# Patient Record
Sex: FEMALE | Race: WHITE | NOT HISPANIC OR LATINO | Employment: UNEMPLOYED | URBAN - METROPOLITAN AREA
[De-identification: names, ages, dates, MRNs, and addresses within clinical notes are randomized per-mention and may not be internally consistent; named-entity substitution may affect disease eponyms.]

---
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
## 2023-01-30 ENCOUNTER — OFFICE VISIT (OUTPATIENT)
Dept: PSYCHIATRY | Facility: CLINIC | Age: 18
End: 2023-01-30

## 2023-01-30 VITALS
BODY MASS INDEX: 18.21 KG/M2 | WEIGHT: 116 LBS | HEIGHT: 67 IN | SYSTOLIC BLOOD PRESSURE: 116 MMHG | HEART RATE: 80 BPM | DIASTOLIC BLOOD PRESSURE: 79 MMHG

## 2023-01-30 DIAGNOSIS — F41.1 GAD (GENERALIZED ANXIETY DISORDER): ICD-10-CM

## 2023-01-30 DIAGNOSIS — F50.89 OTHER DISORDER OF EATING: ICD-10-CM

## 2023-01-30 DIAGNOSIS — F33.9 DEPRESSION, RECURRENT (HCC): Primary | ICD-10-CM

## 2023-01-30 DIAGNOSIS — F34.9 PERSISTENT MOOD (AFFECTIVE) DISORDER, UNSPECIFIED (HCC): ICD-10-CM

## 2023-01-30 DIAGNOSIS — F41.0 PANIC DISORDER: ICD-10-CM

## 2023-01-30 DIAGNOSIS — F43.21 GRIEF: ICD-10-CM

## 2023-01-30 NOTE — BH TREATMENT PLAN
TREATMENT PLAN (Medication Management Only)         [facility]     Name and Date of [address]  2005  Date of Treatment Plan: October 3, 2020, updated June 22, 2021, December 14/2021, June 2, 2022, January 30, 2023  Diagnosis/Diagnoses:    1  Persistent mood (affective) disorder, unspecified ([address] )    2  Panic disorder       Strengths/Personal Resources for Self-Care: supportive family  Area/Areas of need (in own words): anxiety, depression panic  1          Long Term Goal: decrease anxiety, panic attacks and depression, increase weight by 10 lbs  Target Date:6 months - 7/30/2023  Person/Persons responsible for completion of goal: Nadia, provider, family  2          Short Term Objective (s) - How will we reach this goal?:   A  Provider new recommended medication/dosage changes and/or continue medication(s): continue current medications as prescribed (add klonopin, increase buspar to 15 mg BID)  B  coping skills  C  rest and increased nutrition  Target Date:6 months -   7/30/2023  Person/Persons Responsible for Completion of Goal: parisa Zuniga, family  Progress Towards Goals: continuing treatment  Treatment Modality: medication management every 1-3 months  Review due 180 days from date of this plan: 6 months - 7/30/2023     Expected length of service: maintenance  My Physician/PA/NP and I have developed this plan together and I agree to work on the goals and objectives   I understand the treatment goals that were developed for my treatment

## 2023-01-30 NOTE — PSYCH
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  Reports anxiety continues when she goes out, "worried about something bad is going to happen " BF is supportive and she reports the relationship as good  Appetite is improved and she is sleeping  Anxiety keeps her from driving  Takes medication as prescribed  Family are supportive   She is social with friends, feels "a little better going out with them "    HPI ROS Appetite Changes and Sleep: normal appetite and normal energy level    Review Of Systems:     Mood Anxiety and Depression   Behavior Normal    Thought Content Disturbing Thoughts, Feelings   General Emotional Problems   Personality Normal   Other Psych Symptoms Normal   Constitutional As Noted in HPI   ENT As Noted in HPI   Cardiovascular As Noted in HPI   Respiratory As Noted in HPI   Gastrointestinal As Noted in HPI   Genitourinary As Noted in HPI   Musculoskeletal As Noted in HPI   Integumentary As Noted in HPI   Neurological As Noted in HPI   Endocrine Normal    Other Symptoms Normal        Substance Abuse History:  Social History     Substance and Sexual Activity   Drug Use Not Currently       Family Psychiatric History:   Family History   Problem Relation Age of Onset   • Anxiety disorder Mother    • Bipolar disorder Maternal Aunt    • Anxiety disorder Maternal Grandmother    • Depression Maternal Grandmother        The following portions of the patient's history were reviewed and updated as appropriate: allergies, current medications, past family history, past medical history, past social history, past surgical history and problem list     Social History Socioeconomic History   • Marital status: Single     Spouse name: Not on file   • Number of children: Not on file   • Years of education: Not on file   • Highest education level: Not on file   Occupational History   • Not on file   Tobacco Use   • Smoking status: Never   • Smokeless tobacco: Never   Vaping Use   • Vaping Use: Never used   Substance and Sexual Activity   • Alcohol use: Never   • Drug use: Not Currently   • Sexual activity: Yes     Partners: Male   Other Topics Concern   • Not on file   Social History Narrative   • Not on file     Social Determinants of Health     Financial Resource Strain: Not on file   Food Insecurity: Not on file   Transportation Needs: Not on file   Physical Activity: Not on file   Stress: Not on file   Social Connections: Not on file   Intimate Partner Violence: Not on file   Housing Stability: Not on file     Social History     Social History Narrative   • Not on file       Objective:       Mental status:  Appearance calm and cooperative , adequate hygiene and grooming and good eye contact    Mood anxious   Affect affect appropriate    Speech a normal rate   Thought Processes normal thought processes   Hallucinations no hallucinations present    Thought Content no delusions   Abnormal Thoughts no suicidal thoughts  and no homicidal thoughts    Orientation  oriented to person and place and time   Remote Memory short term memory intact and long term memory intact   Attention Span concentration intact   Intellect Appears to be of Average Intelligence   Insight Limited insight   Judgement judgment was limited   Muscle Strength Muscle strength and tone were normal   Language no difficulty naming common objects   Fund of Knowledge displays adequate knowledge of current events   Pain none   Pain Scale 0       Assessment/Plan:       Diagnoses and all orders for this visit:    Depression, recurrent ([facility] [address] )    Other disorder of eating    RAJAN (generalized anxiety disorder)  - cariprazine (VRAYLAR) 3 MG capsule; Take 1 capsule (3 mg total) by mouth daily    Grief death of father December 2021    Panic disorder  -     cariprazine (VRAYLAR) 3 MG capsule; Take 1 capsule (3 mg total) by mouth daily    Persistent mood (affective) disorder, unspecified (HCC)  -     cariprazine (VRAYLAR) 3 MG capsule; Take 1 capsule (3 mg total) by mouth daily            Treatment Recommendations- Risks Benefits      Immediate Medical/Psychiatric/Psychotherapy Treatments and Any Precautions:  reviewed medication continue with treatment plan Vraylar 3 mg daily    Risks, Benefits And Possible Side Effects Of Medications:  {PSYCH RISK, BENEFITS AND POSSIBLE SIDE EFFECTS (Optional):43379    Controlled Medication Discussion: she is aware of safe use and storage of medication     Psychotherapy Provided: 30 min Individual psychotherapy provided  Supportive therapy  Medication evaluation  Mood assessment  Coping skills with friends    Medication education    Goals discussed in session: decrease anxiety      Treatment plan due this session enacted October 3, 2020, updated June 22, 2021, December 14/2021, June 2, 2022, January 30, 2023

## 2023-03-20 DIAGNOSIS — F32.A DEPRESSION, UNSPECIFIED DEPRESSION TYPE: ICD-10-CM

## 2023-03-20 RX ORDER — DESVENLAFAXINE 100 MG/1
TABLET, EXTENDED RELEASE ORAL
Qty: 30 TABLET | Refills: 3 | Status: SHIPPED | OUTPATIENT
Start: 2023-03-20

## 2023-03-20 NOTE — TELEPHONE ENCOUNTER
Medication Refill Request     Name of Medication desvenlafaxine (PRISTIQ) 100 mg 24 hr tablet    Dose/Frequency Take 1 tablet (100 mg total) by mouth every morning  Quantity 30  Verified pharmacy   [x]  Verified ordering Provider   [x]  Does patient have enough for the next 3 days? Yes [x] No []  Does patient have a follow-up appointment scheduled?  Yes [x] No []   If so when is appointment: 04/06/23 @ 10 am

## 2023-03-30 ENCOUNTER — OFFICE VISIT (OUTPATIENT)
Dept: URGENT CARE | Facility: CLINIC | Age: 18
End: 2023-03-30

## 2023-03-30 VITALS
WEIGHT: 120 LBS | TEMPERATURE: 98.7 F | BODY MASS INDEX: 18.79 KG/M2 | HEART RATE: 86 BPM | RESPIRATION RATE: 14 BRPM | OXYGEN SATURATION: 100 %

## 2023-03-30 DIAGNOSIS — R30.0 DYSURIA: Primary | ICD-10-CM

## 2023-03-30 LAB
SL AMB  POCT GLUCOSE, UA: ABNORMAL
SL AMB LEUKOCYTE ESTERASE,UA: ABNORMAL
SL AMB POCT BILIRUBIN,UA: ABNORMAL
SL AMB POCT BLOOD,UA: ABNORMAL
SL AMB POCT CLARITY,UA: CLEAR
SL AMB POCT COLOR,UA: YELLOW
SL AMB POCT KETONES,UA: ABNORMAL
SL AMB POCT NITRITE,UA: ABNORMAL
SL AMB POCT PH,UA: 7
SL AMB POCT SPECIFIC GRAVITY,UA: 1.01
SL AMB POCT URINE PROTEIN: ABNORMAL
SL AMB POCT UROBILINOGEN: 0.2

## 2023-03-30 RX ORDER — PHENAZOPYRIDINE HYDROCHLORIDE 200 MG/1
200 TABLET, FILM COATED ORAL
Qty: 6 TABLET | Refills: 0 | Status: SHIPPED | OUTPATIENT
Start: 2023-03-30 | End: 2023-04-01

## 2023-03-30 NOTE — LETTER
March 30, 2023     Patient: Gita Medrano   YOB: 2005   Date of Visit: 3/30/2023       To Whom it May Concern:    Bianca Arauz was seen in my clinic on 3/30/2023  She may return to school/work on 3/31/2023  If you have any questions or concerns, please don't hesitate to call           Sincerely,          Norma Leyva PA-C        CC: No Recipients

## 2023-03-30 NOTE — PROGRESS NOTES
[facility]        NAME: Rose Paul is a 21 y o  female  : 2005    MRN: [id number]  DATE: 2023  TIME: 1:38 PM    Assessment and Plan   Dysuria [R30 0]  1  Dysuria  POCT urine dip    phenazopyridine (PYRIDIUM) 200 mg tablet    Urine culture        Must stay hydrated  UA normal so will hold off on abx unless culture comes back positive  Discussed strict return to care precautions as well as red flag symptoms which should prompt immediate ED referral  Pt verbalized understanding and is in agreement with plan  Please follow up with your primary care provider within the next week  Please remember that your visit today was with an urgent care provider and should not replace follow up with your primary care provider for chronic medical issues or annual physicals  Patient Instructions       Follow up with PCP in 3-5 days  Proceed to  ER if symptoms worsen  Chief Complaint     Chief Complaint   Patient presents with   • Possible UTI     Pt presents with burning sensation with urination, frequency; started this morning         History of Present Illness       Pt is a(n) 21 y o  female with pmh recurrent UTIs who presents out of concern for a UTI  Dysuria: Yes  Urgency: Yes  Frequency: Yes  Hematuria: No  Cloudy/malodorous urine: No  Vaginal discharge/itching: No  Concerns for STD: No  Possibility of pregnancy: No  LMP: Patient's last menstrual period was 2023 (exact date)  Fever: No  Flank pain: No  Nausea/vomiting: No  Previous UTIs: Yes  Last UTI: 1 mo ago        Review of Systems   Review of Systems   Constitutional: Negative for chills, diaphoresis and fever  Respiratory: Negative for shortness of breath  Cardiovascular: Negative for chest pain  Gastrointestinal: Negative for abdominal pain, nausea and vomiting  Genitourinary: Positive for dysuria, frequency and urgency  Negative for flank pain and hematuria     Musculoskeletal: Negative for back pain and myalgias  Psychiatric/Behavioral: Negative for confusion  Current Medications       Current Outpatient Medications:   •  cariprazine (VRAYLAR) 3 MG capsule, Take 1 capsule (3 mg total) by mouth daily, Disp: 30 capsule, Rfl: 3  •  desvenlafaxine (PRISTIQ) 100 mg 24 hr tablet, TAKE ONE TABLET BY MOUTH EVERY MORNING, Disp: 30 tablet, Rfl: 3  •  hydrOXYzine HCL (ATARAX) 10 mg tablet, Take 1 tablet (10 mg total) by mouth every 6 (six) hours as needed for anxiety, Disp: 30 tablet, Rfl: 0  •  phenazopyridine (PYRIDIUM) 200 mg tablet, Take 1 tablet (200 mg total) by mouth 3 (three) times a day with meals for 2 days, Disp: 6 tablet, Rfl: 0  •  fluvoxaMINE (LUVOX) 25 MG tablet, Take 25 mg by mouth daily at bedtime (Patient not taking: Reported on 8/21/2022), Disp: , Rfl:   •  L-Methylfolate 15 MG TABS, Take 15 mg by mouth in the morning (Patient not taking: Reported on 8/21/2022), Disp: , Rfl:   •  OXcarbazepine (TRILEPTAL) 150 mg tablet, Take 150 mg by mouth daily (Patient not taking: Reported on 8/21/2022), Disp: , Rfl:     Current Allergies     Allergies as of 03/30/2023   • (No Known Allergies)            The following portions of the patient's history were reviewed and updated as appropriate: allergies, current medications, past family history, past medical history, past social history, past surgical history and problem list      Past Medical History:   Diagnosis Date   • Anxiety    • Depression    • Eating disorder    • Panic attack    • Urinary tract infection        Past Surgical History:   Procedure Laterality Date   • NO PAST SURGERIES         Family History   Problem Relation Age of Onset   • Anxiety disorder Mother    • Bipolar disorder Maternal Aunt    • Anxiety disorder Maternal Grandmother    • Depression Maternal Grandmother          Medications have been verified          Objective   Pulse 86   Temp 98 7 °F (37 1 °C)   Resp 14   Wt 54 4 kg (120 lb)   LMP 03/30/2023 (Exact Date)   SpO2 100% BMI 18 79 kg/m²        Physical Exam     Physical Exam  Vitals and nursing note reviewed  Constitutional:       General: She is not in acute distress  Appearance: Normal appearance  She is not ill-appearing  HENT:      Head: Normocephalic and atraumatic  Cardiovascular:      Rate and Rhythm: Normal rate and regular rhythm  Heart sounds: Normal heart sounds  Pulmonary:      Effort: Pulmonary effort is normal  No respiratory distress  Breath sounds: Normal breath sounds  No wheezing, rhonchi or rales  Abdominal:      General: Abdomen is flat  Palpations: Abdomen is soft  Tenderness: There is no abdominal tenderness  There is no right CVA tenderness, left CVA tenderness or guarding  Skin:     General: Skin is warm and dry  Capillary Refill: Capillary refill takes less than 2 seconds  Neurological:      Mental Status: She is alert and oriented to person, place, and time     Psychiatric:         Behavior: Behavior normal

## 2023-04-02 LAB — BACTERIA UR CULT: ABNORMAL

## 2023-04-06 ENCOUNTER — OFFICE VISIT (OUTPATIENT)
Dept: PSYCHIATRY | Facility: CLINIC | Age: 18
End: 2023-04-06

## 2023-04-06 DIAGNOSIS — F41.1 GAD (GENERALIZED ANXIETY DISORDER): ICD-10-CM

## 2023-04-06 DIAGNOSIS — F41.0 PANIC DISORDER: ICD-10-CM

## 2023-04-06 DIAGNOSIS — F50.89 OTHER DISORDER OF EATING: ICD-10-CM

## 2023-04-06 DIAGNOSIS — Z91.199 NO-SHOW FOR APPOINTMENT: ICD-10-CM

## 2023-04-06 DIAGNOSIS — F34.9 PERSISTENT MOOD (AFFECTIVE) DISORDER, UNSPECIFIED (HCC): Primary | ICD-10-CM

## 2023-04-06 DIAGNOSIS — F43.21 GRIEF: ICD-10-CM

## 2023-04-06 NOTE — PSYCH
No Call  No Show   No Charge    Neema Boston no showed 04/06/23 appointment , staff called and left message to reschedule appointment     Treatment Plan not completed within required time limits due to: Neema Boston no show appointment on 04/06/23

## 2023-05-04 ENCOUNTER — OFFICE VISIT (OUTPATIENT)
Dept: PSYCHIATRY | Facility: CLINIC | Age: 18
End: 2023-05-04

## 2023-05-04 VITALS
SYSTOLIC BLOOD PRESSURE: 104 MMHG | HEART RATE: 78 BPM | HEIGHT: 67 IN | BODY MASS INDEX: 18.21 KG/M2 | WEIGHT: 116 LBS | DIASTOLIC BLOOD PRESSURE: 66 MMHG

## 2023-05-04 DIAGNOSIS — F33.9 DEPRESSION, RECURRENT (HCC): ICD-10-CM

## 2023-05-04 DIAGNOSIS — F41.0 PANIC DISORDER: ICD-10-CM

## 2023-05-04 DIAGNOSIS — F43.21 GRIEF: Primary | ICD-10-CM

## 2023-05-04 DIAGNOSIS — F34.9 PERSISTENT MOOD (AFFECTIVE) DISORDER, UNSPECIFIED (HCC): ICD-10-CM

## 2023-05-04 DIAGNOSIS — F50.89 OTHER DISORDER OF EATING: ICD-10-CM

## 2023-05-04 DIAGNOSIS — F41.1 GAD (GENERALIZED ANXIETY DISORDER): ICD-10-CM

## 2023-05-09 NOTE — PSYCH
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 and is taking online courses  She went to a trip to [facility] and was very nervous on the flight and became homesick; however, she did not have a panic attack  She denied having intrusive thoughts and cannot remember when she had a panic attack  Her boyfriend is supportive  She reports being social and \"hangs out\" with her friends  Takes medication as prescribed  Family, friends and boyfriend are supportive      HPI ROS Appetite Changes and Sleep: normal appetite and normal energy level    Review Of Systems:     Mood Anxiety   Behavior Normal    Thought Content Normal   General Emotional Problems   Personality Normal   Other Psych Symptoms Normal   Constitutional As Noted in HPI   ENT As Noted in HPI   Cardiovascular    Respiratory As Noted in HPI   Gastrointestinal As Noted in HPI   Genitourinary As Noted in HPI   Musculoskeletal As Noted in HPI   Integumentary As Noted in HPI   Neurological As Noted in HPI   Endocrine Normal    Other Symptoms Normal        Substance Abuse History:  Social History     Substance and Sexual Activity   Drug Use Not Currently       Family Psychiatric History:   Family History   Problem Relation Age of Onset   • Anxiety disorder Mother    • Bipolar disorder Maternal Aunt    • Anxiety disorder Maternal Grandmother    • Depression Maternal Grandmother        The following portions of the patient's history were reviewed and updated as appropriate: allergies, current medications, past family history, past " medical history, past social history, past surgical history and problem list     Social History     Socioeconomic History   • Marital status: Single     Spouse name: Not on file   • Number of children: Not on file   • Years of education: Not on file   • Highest education level: Not on file   Occupational History   • Not on file   Tobacco Use   • Smoking status: Never     Passive exposure: Never   • Smokeless tobacco: Never   Vaping Use   • Vaping Use: Every day   • Substances: Nicotine   Substance and Sexual Activity   • Alcohol use: Never   • Drug use: Not Currently   • Sexual activity: Yes     Partners: Male   Other Topics Concern   • Not on file   Social History Narrative   • Not on file     Social Determinants of Health     Financial Resource Strain: Not on file   Food Insecurity: Not on file   Transportation Needs: Not on file   Physical Activity: Not on file   Stress: Not on file   Social Connections: Not on file   Intimate Partner Violence: Not on file   Housing Stability: Not on file     Social History     Social History Narrative   • Not on file       Objective:       Mental status:  Appearance calm and cooperative , adequate hygiene and grooming and good eye contact    Mood anxious   Affect affect appropriate    Speech a normal rate   Thought Processes normal thought processes   Hallucinations no hallucinations present    Thought Content no delusions   Abnormal Thoughts no suicidal thoughts  and no homicidal thoughts    Orientation  oriented to person and place and time   Remote Memory short term memory intact and long term memory intact   Attention Span concentration intact   Intellect Appears to be of Average Intelligence   Insight Insight intact   Judgement judgment was intact   Muscle Strength Muscle strength and tone were normal   Language no difficulty naming common objects   Fund of Knowledge displays adequate knowledge of current events   Pain none   Pain Scale 0       Assessment/Plan: Diagnoses and all orders for this visit:    Grief death of father December 2021    RAJAN (generalized anxiety disorder)  -     cariprazine (VRAYLAR) 3 MG capsule; Take 1 capsule (3 mg total) by mouth daily    Panic disorder  -     cariprazine (VRAYLAR) 3 MG capsule; Take 1 capsule (3 mg total) by mouth daily    Persistent mood (affective) disorder, unspecified (HCC)  -     cariprazine (VRAYLAR) 3 MG capsule; Take 1 capsule (3 mg total) by mouth daily    Other disorder of eating    [facility] )            Treatment Recommendations- Risks Benefits      Immediate Medical/Psychiatric/Psychotherapy Treatments and Any Precautions:  reviewed medication continue with treatment plan    Risks, Benefits And Possible Side Effects Of Medications:  {PSYCH RISK, BENEFITS AND POSSIBLE SIDE EFFECTS (Optional):32190       Psychotherapy Provided: 30 min Individual psychotherapy provided  Supportive therapy  Medication evaluation  Mood assessment  Positive reinforcement provided to [facility] for her improvement      Goals discussed in session: Maintain stable mood    Treatment plan not due

## 2023-05-19 ENCOUNTER — TELEPHONE (OUTPATIENT)
Dept: PSYCHIATRY | Facility: CLINIC | Age: 18
End: 2023-05-19

## 2023-05-19 NOTE — TELEPHONE ENCOUNTER
I was unable to leave a  for pt to return call to intake in regards to talk therapy services because  is full

## 2023-05-31 DIAGNOSIS — F34.9 PERSISTENT MOOD (AFFECTIVE) DISORDER, UNSPECIFIED (HCC): ICD-10-CM

## 2023-05-31 DIAGNOSIS — F41.0 PANIC DISORDER: ICD-10-CM

## 2023-05-31 DIAGNOSIS — F41.1 GAD (GENERALIZED ANXIETY DISORDER): ICD-10-CM

## 2023-05-31 RX ORDER — CARIPRAZINE 3 MG/1
CAPSULE, GELATIN COATED ORAL
Qty: 30 CAPSULE | Refills: 3 | Status: SHIPPED | OUTPATIENT
Start: 2023-05-31

## 2023-07-15 DIAGNOSIS — F32.A DEPRESSION, UNSPECIFIED DEPRESSION TYPE: ICD-10-CM

## 2023-07-15 RX ORDER — DESVENLAFAXINE 100 MG/1
TABLET, EXTENDED RELEASE ORAL
Qty: 30 TABLET | Refills: 3 | Status: SHIPPED | OUTPATIENT
Start: 2023-07-15

## 2023-09-22 ENCOUNTER — DOCUMENTATION (OUTPATIENT)
Dept: BEHAVIORAL/MENTAL HEALTH CLINIC | Facility: CLINIC | Age: 18
End: 2023-09-22

## 2023-09-22 NOTE — PROGRESS NOTES
Psychotherapy Discharge Summary    Preferred Name: Wicho Wooten  YOB: 2005    Admission date to psychotherapy: 1/17/22    Referred by: Dr. Micky Fitzpatrick    Presenting Problem: Grief, Anxiety    Course of treatment included : family counseling, individual therapy  and Referral to [facility]    Progress/Outcome of Treatment Goals (brief summary of course of treatment) Nadia attended sessions along with medication management, CBT was primarily used. Kike Nurse was not making gains and it was agreed that she would be referred to [address] [place] [address]. Treatment Complications (if any): none    Treatment Progress: fair    Current [facility] Psychiatric Provider: Dr. Micky Fitzpatrick    Discharge Medications include: This provider does not prescribe or manage medications. Discharge Date: 9/22/23    Discharge Diagnosis: Agoraphobia with paic attacks;  Persistent Mood DO, unspecified    Criteria for Discharge: need to be transferred to another service/level of care within the system    Aftercare recommendations include (include specific referral names and phone numbers, if appropriate):   -Call 911 if psychiatric emergency/suicidal thoughts  -Utilize 988 or the Talk to Text line for support by texting "Hello" to [phone]  -Follow up with referral for [facility]    Prognosis: fair

## 2023-10-18 DIAGNOSIS — F32.A DEPRESSION, UNSPECIFIED DEPRESSION TYPE: ICD-10-CM

## 2023-10-18 RX ORDER — DESVENLAFAXINE 100 MG/1
100 TABLET, EXTENDED RELEASE ORAL EVERY MORNING
Qty: 30 TABLET | Refills: 3 | Status: SHIPPED | OUTPATIENT
Start: 2023-10-18

## 2023-10-18 NOTE — TELEPHONE ENCOUNTER
Called patient to schedule appointment with Dr. Tarun Estrella but her VM was full, could not leave message.

## 2023-10-30 ENCOUNTER — TELEPHONE (OUTPATIENT)
Dept: PSYCHIATRY | Facility: CLINIC | Age: 18
End: 2023-10-30

## 2023-10-30 NOTE — TELEPHONE ENCOUNTER
Patient called regarding services. Placed on Epic wait list with preferences below.      Medication Management and Talk Therapy    Zoie Sotelo, Female provider, NO ROF, and prefers in person    Insurance: [employer]  Type: [address]: Shanique Caldwell

## 2023-10-31 ENCOUNTER — OFFICE VISIT (OUTPATIENT)
Dept: FAMILY MEDICINE CLINIC | Facility: CLINIC | Age: 18
End: 2023-10-31
Payer: COMMERCIAL

## 2023-10-31 VITALS
SYSTOLIC BLOOD PRESSURE: 118 MMHG | TEMPERATURE: 97.8 F | RESPIRATION RATE: 18 BRPM | BODY MASS INDEX: 19.71 KG/M2 | WEIGHT: 125.6 LBS | HEART RATE: 90 BPM | HEIGHT: 67 IN | DIASTOLIC BLOOD PRESSURE: 72 MMHG

## 2023-10-31 DIAGNOSIS — Z00.00 ROUTINE ADULT HEALTH MAINTENANCE: Primary | ICD-10-CM

## 2023-10-31 DIAGNOSIS — F33.9 DEPRESSION, RECURRENT (HCC): ICD-10-CM

## 2023-10-31 PROBLEM — F43.21 GRIEF: Status: RESOLVED | Noted: 2022-03-03 | Resolved: 2023-10-31

## 2023-10-31 PROBLEM — F34.9 PERSISTENT MOOD (AFFECTIVE) DISORDER, UNSPECIFIED (HCC): Status: RESOLVED | Noted: 2020-10-03 | Resolved: 2023-10-31

## 2023-10-31 PROCEDURE — 99395 PREV VISIT EST AGE 18-39: CPT | Performed by: NURSE PRACTITIONER

## 2023-10-31 NOTE — PROGRESS NOTES
Assessment/Plan:    {There are no diagnoses linked to this encounter. (Refresh or delete this SmartLink)}        There are no Patient Instructions on file for this visit. No follow-ups on file. Subjective:      Patient ID: Ro Shaw is a 19 y.o. female. Chief Complaint   Patient presents with   • YURI Simon    The following portions of the patient's history were reviewed and updated as appropriate: allergies, current medications, past family history, past medical history, past social history, past surgical history and problem list.    Review of Systems      Current Outpatient Medications   Medication Sig Dispense Refill   • desvenlafaxine (PRISTIQ) 100 mg 24 hr tablet Take 1 tablet (100 mg total) by mouth every morning 30 tablet 3   • hydrOXYzine HCL (ATARAX) 10 mg tablet Take 1 tablet (10 mg total) by mouth every 6 (six) hours as needed for anxiety 30 tablet 0   • Vraylar 3 MG capsule TAKE ONE CAPSULE BY MOUTH EVERY DAY 30 capsule 3     No current facility-administered medications for this visit.        Objective:    /72   Pulse 90   Temp 97.8 °F (36.6 °C)   Resp 18   Ht 5' 7" (1.702 m)   Wt 57 kg (125 lb 9.6 oz)   LMP 10/16/2023 (Exact Date)   BMI 19.67 kg/m²        Physical Exam           FAMILIA Norton

## 2023-10-31 NOTE — PROGRESS NOTES
[address] [place]    NAME: Michelle Grigsby  AGE: 19 y.o. SEX: female  : 2005     DATE: 10/31/2023     Assessment and Plan:     Problem List Items Addressed This Visit        Other    Depression, recurrent ([address])   Other Visit Diagnoses     Routine adult health maintenance    -  Primary          Immunizations and preventive care screenings were discussed with patient today. Appropriate education was printed on patient's after visit summary. Counseling:  Sexual health: discussed sexually transmitted diseases, partner selection, use of condoms, avoidance of unintended pregnancy, and contraceptive alternatives. Exercise: the importance of regular exercise/physical activity was discussed. Recommend exercise 3-5 times per week for at least 30 minutes. Return for Annual physical.     Chief Complaint:     Chief Complaint   Patient presents with   Carrie Anderson LPN        History of Present Illness:     Adult Annual Physical   Patient here for a comprehensive physical exam. The patient reports no problems. Diet and Physical Activity  Diet/Nutrition: well balanced diet. Exercise: no formal exercise. Depression Screening  PHQ-2/9 Depression Screening    Little interest or pleasure in doing things: 1 - several days  Feeling down, depressed, or hopeless: 0 - not at all  Trouble falling or staying asleep, or sleeping too much: 3 - nearly every day  Feeling tired or having little energy: 1 - several days  Poor appetite or overeatin - not at all  Feeling bad about yourself - or that you are a failure or have let yourself or your family down: 0 - not at all  Trouble concentrating on things, such as reading the newspaper or watching television: 0 - not at all  Moving or speaking so slowly that other people could have noticed.  Or the opposite - being so fidgety or restless that you have been moving around a lot more than usual: 0 - not at all  Thoughts that you would be better off dead, or of hurting yourself in some way: 0 - not at all  PHQ-9 Score: 5   PHQ-9 Interpretation: Mild depression        General Health  Sleep: sleeps well. Hearing: normal - bilateral.  Vision: no vision problems. Dental: regular dental visits. /GYN Health  Cycles regular  Has been on OCPs in the past briefly, worried about how this may affect her mental health     Review of Systems:     Review of Systems   Constitutional: Negative. Respiratory: Negative. Cardiovascular: Negative. Gastrointestinal: Negative. Neurological: Negative.        Past Medical History:     Past Medical History:   Diagnosis Date   • Anxiety    • Depression    • Eating disorder    • Panic attack    • Urinary tract infection       Past Surgical History:     Past Surgical History:   Procedure Laterality Date   • NO PAST SURGERIES        Social History:     Social History     Socioeconomic History   • Marital status: Single     Spouse name: None   • Number of children: None   • Years of education: None   • Highest education level: None   Occupational History   • None   Tobacco Use   • Smoking status: Never     Passive exposure: Never   • Smokeless tobacco: Never   Vaping Use   • Vaping Use: Every day   • Substances: Nicotine   Substance and Sexual Activity   • Alcohol use: Never   • Drug use: Not Currently   • Sexual activity: Yes     Partners: Male   Other Topics Concern   • None   Social History Narrative   • None     Social Determinants of Health     Financial Resource Strain: Not on file   Food Insecurity: Not on file   Transportation Needs: Not on file   Physical Activity: Not on file   Stress: Not on file   Social Connections: Not on file   Intimate Partner Violence: Not on file   Housing Stability: Not on file      Family History:     Family History   Problem Relation Age of Onset   • Anxiety disorder Mother    • Leukemia Father    • Diverticulosis Father    • Anxiety disorder Maternal Grandmother    • Depression Maternal Grandmother    • Bipolar disorder Maternal Aunt       Current Medications:     Current Outpatient Medications   Medication Sig Dispense Refill   • desvenlafaxine (PRISTIQ) 100 mg 24 hr tablet Take 1 tablet (100 mg total) by mouth every morning 30 tablet 3   • hydrOXYzine HCL (ATARAX) 10 mg tablet Take 1 tablet (10 mg total) by mouth every 6 (six) hours as needed for anxiety 30 tablet 0   • Vraylar 3 MG capsule TAKE ONE CAPSULE BY MOUTH EVERY DAY 30 capsule 3     No current facility-administered medications for this visit. Allergies:     No Known Allergies   Physical Exam:     /72   Pulse 90   Temp 97.8 °F (36.6 °C)   Resp 18   Ht 5' 7" (1.702 m)   Wt 57 kg (125 lb 9.6 oz)   LMP 10/16/2023 (Exact Date)   BMI 19.67 kg/m²     Physical Exam  Vitals and nursing note reviewed. Constitutional:       General: She is not in acute distress. Appearance: Normal appearance. She is well-developed. HENT:      Head: Normocephalic and atraumatic. Right Ear: Tympanic membrane and external ear normal.      Left Ear: Tympanic membrane and external ear normal.      Nose: Nose normal.      Mouth/Throat:      Pharynx: Oropharynx is clear. Eyes:      Extraocular Movements: Extraocular movements intact. Conjunctiva/sclera: Conjunctivae normal.      Pupils: Pupils are equal, round, and reactive to light. Neck:      Thyroid: No thyromegaly. Vascular: No carotid bruit. Cardiovascular:      Rate and Rhythm: Normal rate and regular rhythm. Pulses: Normal pulses. Heart sounds: Normal heart sounds. No murmur heard. Pulmonary:      Effort: Pulmonary effort is normal. No respiratory distress. Breath sounds: Normal breath sounds. Abdominal:      General: Bowel sounds are normal. There is no distension. Palpations: Abdomen is soft. Tenderness: There is no abdominal tenderness. Musculoskeletal:         General: No swelling or deformity. Normal range of motion. Cervical back: Normal range of motion and neck supple. Lymphadenopathy:      Cervical: No cervical adenopathy. Skin:     General: Skin is warm and dry. Capillary Refill: Capillary refill takes less than 2 seconds. Neurological:      Mental Status: She is alert. Sensory: No sensory deficit. Motor: No abnormal muscle tone.       Coordination: Coordination normal.      Deep Tendon Reflexes: Reflexes normal.   Psychiatric:         Mood and Affect: Mood normal.         Behavior: Behavior normal.          Joness Primrose, [address] [facility]

## 2023-11-26 DIAGNOSIS — F32.A DEPRESSION, UNSPECIFIED DEPRESSION TYPE: ICD-10-CM

## 2023-11-27 RX ORDER — DESVENLAFAXINE 100 MG/1
100 TABLET, EXTENDED RELEASE ORAL EVERY MORNING
Qty: 30 TABLET | Refills: 11 | Status: SHIPPED | OUTPATIENT
Start: 2023-11-27

## 2023-11-29 ENCOUNTER — TELEPHONE (OUTPATIENT)
Dept: BEHAVIORAL/MENTAL HEALTH CLINIC | Facility: CLINIC | Age: 18
End: 2023-11-29

## 2023-11-29 NOTE — TELEPHONE ENCOUNTER
Tried to call pt to schedule an appt with Dr. Aviva Gaffney. Could not leave message due to voicemail full.

## 2024-01-18 ENCOUNTER — TELEPHONE (OUTPATIENT)
Dept: PSYCHIATRY | Facility: CLINIC | Age: 19
End: 2024-01-18

## 2024-01-18 DIAGNOSIS — F41.1 GAD (GENERALIZED ANXIETY DISORDER): ICD-10-CM

## 2024-01-18 DIAGNOSIS — F41.0 PANIC DISORDER: ICD-10-CM

## 2024-01-18 DIAGNOSIS — F34.9 PERSISTENT MOOD (AFFECTIVE) DISORDER, UNSPECIFIED (HCC): ICD-10-CM

## 2024-01-18 RX ORDER — CARIPRAZINE 3 MG/1
CAPSULE, GELATIN COATED ORAL
Qty: 30 CAPSULE | Refills: 0 | Status: SHIPPED | OUTPATIENT
Start: 2024-01-18

## 2024-02-02 ENCOUNTER — TELEPHONE (OUTPATIENT)
Dept: FAMILY MEDICINE CLINIC | Facility: CLINIC | Age: 19
End: 2024-02-02

## 2024-02-02 NOTE — TELEPHONE ENCOUNTER
Pt dropped off working paper physical form for Shruti, in nurse pending one. Please call when completed.

## 2024-02-05 NOTE — TELEPHONE ENCOUNTER
Form completed, requires TB screening, and I do not see a PPD  in her record.  Form is nurse pending. FAMILIA Rice

## 2024-02-05 NOTE — TELEPHONE ENCOUNTER
Mom advised, she's going to call her previous doctor to find out. She'll call back to set up the PPD appt if needed.

## 2024-02-05 NOTE — TELEPHONE ENCOUNTER
Left message for patient to call back.  Find out if she had ppd done anywhere else- if not schedule nurse visit    Shruti Majano CMA

## 2024-02-13 ENCOUNTER — TELEPHONE (OUTPATIENT)
Dept: PSYCHIATRY | Facility: CLINIC | Age: 19
End: 2024-02-13

## 2024-02-13 ENCOUNTER — OFFICE VISIT (OUTPATIENT)
Dept: URGENT CARE | Facility: CLINIC | Age: 19
End: 2024-02-13
Payer: COMMERCIAL

## 2024-02-13 VITALS
BODY MASS INDEX: 22.6 KG/M2 | RESPIRATION RATE: 18 BRPM | HEIGHT: 67 IN | TEMPERATURE: 98 F | HEART RATE: 110 BPM | OXYGEN SATURATION: 100 % | WEIGHT: 144 LBS

## 2024-02-13 DIAGNOSIS — J02.9 SORE THROAT: Primary | ICD-10-CM

## 2024-02-13 LAB — S PYO AG THROAT QL: NEGATIVE

## 2024-02-13 PROCEDURE — 87880 STREP A ASSAY W/OPTIC: CPT | Performed by: PHYSICIAN ASSISTANT

## 2024-02-13 PROCEDURE — 87636 SARSCOV2 & INF A&B AMP PRB: CPT | Performed by: PHYSICIAN ASSISTANT

## 2024-02-13 PROCEDURE — 87070 CULTURE OTHR SPECIMN AEROBIC: CPT | Performed by: PHYSICIAN ASSISTANT

## 2024-02-13 PROCEDURE — 99213 OFFICE O/P EST LOW 20 MIN: CPT | Performed by: PHYSICIAN ASSISTANT

## 2024-02-13 RX ORDER — BENZONATATE 100 MG/1
100 CAPSULE ORAL 3 TIMES DAILY PRN
Qty: 20 CAPSULE | Refills: 0 | Status: SHIPPED | OUTPATIENT
Start: 2024-02-13 | End: 2024-02-22

## 2024-02-13 NOTE — PROGRESS NOTES
[facility]        NAME: Nadia Galeano is a 18 y.o. female  : 2005    MRN: [id number]  DATE: 2024  TIME: 2:50 PM    Assessment and Plan   Sore throat [J02.9]  1. Sore throat  Covid19 and INFLUENZA A/B PCR    POCT rapid ANTIGEN strepA    Throat culture    benzonatate (TESSALON PERLES) 100 mg capsule            Patient Instructions     Patient Instructions   - rapid strep test. I will send the culture out to the lab as well as a COVID/FLU test. Results should be available within 24-48 hours.   Viral Syndrome   WHAT YOU NEED TO KNOW:   Viral syndrome is a term used for symptoms of an infection caused by a virus. Viruses are spread easily from person to person on shared items.  DISCHARGE INSTRUCTIONS:   Call your local emergency number (911 in the [place]), or have someone call if:   You have a seizure.    You cannot be woken.    You have chest pain or trouble breathing.    Return to the emergency department if:   You have a stiff neck, a bad headache, and sensitivity to light.    You feel weak, dizzy, or confused.    You stop urinating or urinate a lot less than usual.    You cough up blood or thick yellow or green mucus.    You have severe abdominal pain or your abdomen is larger than usual.    Call your doctor if:   Your symptoms do not get better with treatment or get worse after 3 days.    You have a rash or ear pain.    You have burning when you urinate.    You have questions or concerns about your condition or care.    Medicines:  You may  need any of the following:  Acetaminophen  decreases pain and fever. It is available without a doctor's order. Ask how much to take and how often to take it. Follow directions. Read the labels of all other medicines you are using to see if they also contain acetaminophen, or ask your doctor or pharmacist. Acetaminophen can cause liver damage if not taken correctly.    NSAIDs , such as ibuprofen, help decrease swelling, pain, and fever. NSAIDs can  cause stomach bleeding or kidney problems in certain people. If you take blood thinner medicine, always ask your healthcare provider if NSAIDs are safe for you. Always read the medicine label and follow directions.    Cold medicine  helps decrease swelling, control a cough, and relieve chest or nasal congestion.     Saline nasal spray  helps decrease nasal congestion.     Take your medicine as directed.  Contact your healthcare provider if you think your medicine is not helping or if you have side effects. Tell your provider if you are allergic to any medicine. Keep a list of the medicines, vitamins, and herbs you take. Include the amounts, and when and why you take them. Bring the list or the pill bottles to follow-up visits. Carry your medicine list with you in case of an emergency.    Manage your symptoms:   Drink liquids as directed to prevent dehydration.  Ask how much liquid to drink each day and which liquids are best for you. Do not drink liquids with caffeine. Caffeine can make dehydration worse.     Get plenty of rest to help your body heal.  Take naps throughout the day. Ask your healthcare provider when you can return to work and your normal activities.    Use a cool mist humidifier  to increase air moisture in your home. This may make it easier for you to breathe and help decrease your cough.     Drink tea with honey or use cough drops for a sore throat.  Cough drops are available without a doctor's order. Follow directions for taking cough drops.    Do not smoke or be close to anyone who is smoking.  Nicotine and other chemicals in cigarettes and cigars can cause lung damage. Smoking can also delay healing. Ask your healthcare provider for information if you currently smoke and need help to quit. E-cigarettes or smokeless tobacco still contain nicotine. Talk to your healthcare provider before you use these products.    Prevent the spread of germs:   Wash your hands often throughout the day.  Use soap  and water. Rub your soapy hands together, lacing your fingers, for at least 20 seconds. Rinse with warm, running water. Dry your hands with a clean towel or paper towel. Use hand  that contains alcohol if soap and water are not available. Teach children how to wash their hands and use hand .         Cover sneezes and coughs.  Turn your face away and cover your mouth and nose with a tissue. Throw the tissue away. Use the bend of your arm if a tissue is not available. Then wash your hands well with soap and water or use hand . Teach children how to cover a cough or sneeze.    Stay home while you are sick.  Avoid crowds as much as possible.    Get the influenza (flu) vaccine as soon as recommended each year.  The flu vaccine is available starting in September or October. Ask your healthcare provider about the pneumonia vaccine. This vaccine is usually recommended every 5 years in older adults.       Follow up with your doctor as directed:  Write down your questions so you remember to ask them during your visits.  © Copyright [employer] 2023 Information is for End User's use only and may not be sold, redistributed or otherwise used for commercial purposes.  The above information is an  only. It is not intended as medical advice for individual conditions or treatments. Talk to your doctor, nurse or pharmacist before following any medical regimen to see if it is safe and effective for you.        Follow up with PCP in 3-5 days.  Proceed to  ER if symptoms worsen.    Chief Complaint     Chief Complaint   Patient presents with    Sore Throat     Sore throat  nasal congestion         History of Present Illness       The patient is a 18-year-old female presenting today for a sore throat, cough, congestion and rhinorrhea.  Symptoms began yesterday.  She does report working and a .  No known sick exposures.        Review of Systems   Review of Systems   Constitutional:  Negative  for activity change, appetite change, chills, fatigue and fever.   HENT:  Positive for congestion, rhinorrhea and sore throat. Negative for ear pain, sinus pressure and sinus pain.    Eyes:  Negative for pain and visual disturbance.   Respiratory:  Negative for cough, chest tightness and shortness of breath.    Cardiovascular:  Negative for chest pain and palpitations.   Gastrointestinal:  Negative for abdominal pain, diarrhea, nausea and vomiting.   Genitourinary:  Negative for dysuria and hematuria.   Musculoskeletal:  Negative for arthralgias, back pain and myalgias.   Skin:  Negative for color change, pallor and rash.   Neurological:  Negative for seizures, syncope and headaches.   All other systems reviewed and are negative.        Current Medications       Current Outpatient Medications:     benzonatate (TESSALON PERLES) 100 mg capsule, Take 1 capsule (100 mg total) by mouth 3 (three) times a day as needed for cough, Disp: 20 capsule, Rfl: 0    cariprazine (Vraylar) 3 MG capsule, TAKE ONE CAPSULE BY MOUTH EVERY DAY, Disp: 30 capsule, Rfl: 0    desvenlafaxine (PRISTIQ) 100 mg 24 hr tablet, TAKE 1 TABLET BY MOUTH IN THE  MORNING, Disp: 30 tablet, Rfl: 11    hydrOXYzine HCL (ATARAX) 10 mg tablet, Take 1 tablet (10 mg total) by mouth every 6 (six) hours as needed for anxiety (Patient not taking: Reported on 2/13/2024), Disp: 30 tablet, Rfl: 0    Current Allergies     Allergies as of 02/13/2024    (No Known Allergies)            The following portions of the patient's history were reviewed and updated as appropriate: allergies, current medications, past family history, past medical history, past social history, past surgical history and problem list.     Past Medical History:   Diagnosis Date    Anxiety     Depression     Eating disorder     Panic attack     Urinary tract infection        Past Surgical History:   Procedure Laterality Date    NO PAST SURGERIES         Family History   Problem Relation Age of Onset  "   Anxiety disorder Mother     Leukemia Father     Diverticulosis Father     Anxiety disorder Maternal Grandmother     Depression Maternal Grandmother     Bipolar disorder Maternal Aunt          Medications have been verified.        Objective   Pulse (!) 110   Temp 98 °F (36.7 °C)   Resp 18   Ht 5' 7\" (1.702 m)   Wt 65.3 kg (144 lb)   LMP 01/16/2024   SpO2 100%   BMI 22.55 kg/m²        Physical Exam     Physical Exam  Vitals and nursing note reviewed.   Constitutional:       General: She is not in acute distress.     Appearance: Normal appearance. She is well-developed and normal weight. She is not ill-appearing, toxic-appearing or diaphoretic.   HENT:      Head: Normocephalic and atraumatic.      Right Ear: Tympanic membrane, ear canal and external ear normal. No drainage, swelling or tenderness. No middle ear effusion. There is no impacted cerumen. Tympanic membrane is not erythematous.      Left Ear: Tympanic membrane, ear canal and external ear normal. No drainage, swelling or tenderness.  No middle ear effusion. There is no impacted cerumen. Tympanic membrane is not erythematous.      Nose: Congestion and rhinorrhea present.      Mouth/Throat:      Mouth: Mucous membranes are moist. No oral lesions.      Pharynx: Oropharynx is clear. Uvula midline. Posterior oropharyngeal erythema present. No pharyngeal swelling, oropharyngeal exudate or uvula swelling.      Tonsils: No tonsillar exudate or tonsillar abscesses. 0 on the right. 0 on the left.   Eyes:      Extraocular Movements: Extraocular movements intact.      Right eye: Normal extraocular motion.      Left eye: Normal extraocular motion.      Conjunctiva/sclera: Conjunctivae normal.      Pupils: Pupils are equal, round, and reactive to light.   Cardiovascular:      Rate and Rhythm: Normal rate and regular rhythm.      Heart sounds: Normal heart sounds. No murmur heard.     No friction rub. No gallop.   Pulmonary:      Effort: Pulmonary effort is " normal. No respiratory distress.      Breath sounds: Normal breath sounds. No stridor. No wheezing, rhonchi or rales.   Chest:      Chest wall: No tenderness.   Abdominal:      General: Abdomen is flat. Bowel sounds are normal.      Palpations: Abdomen is soft.   Musculoskeletal:         General: Normal range of motion.   Lymphadenopathy:      Cervical: Cervical adenopathy present.   Skin:     General: Skin is warm and dry.      Capillary Refill: Capillary refill takes less than 2 seconds.   Neurological:      Mental Status: She is alert.

## 2024-02-13 NOTE — LETTER
February 13, 2024     Patient: Nadia Galeano  YOB: 2005  Date of Visit: 2/13/2024      To Whom it May Concern:    Nadia Galeano is under my professional care. Nadia was seen in my office on 2/13/2024. Nadia may return to work on 12/16 or 2/19 pending negative test results and symptom resolution .    If you have any questions or concerns, please don't hesitate to call.         Sincerely,          Nayeli Garcia PA-C        CC: No Recipients

## 2024-02-13 NOTE — TELEPHONE ENCOUNTER
Received call from patient requesting a call back asking if it is ok to take a Sudafed  with her antidepressants?  Are there any interactions?  Patient awaiting a call back.  Next visit with Dr Aracelis Stoll 2/22/2024.  Patient would like a call back.

## 2024-02-13 NOTE — PATIENT INSTRUCTIONS
- rapid strep test. I will send the culture out to the lab as well as a COVID/FLU test. Results should be available within 24-48 hours.   Viral Syndrome   WHAT YOU NEED TO KNOW:   Viral syndrome is a term used for symptoms of an infection caused by a virus. Viruses are spread easily from person to person on shared items.  DISCHARGE INSTRUCTIONS:   Call your local emergency number (911 in the [place]), or have someone call if:   You have a seizure.    You cannot be woken.    You have chest pain or trouble breathing.    Return to the emergency department if:   You have a stiff neck, a bad headache, and sensitivity to light.    You feel weak, dizzy, or confused.    You stop urinating or urinate a lot less than usual.    You cough up blood or thick yellow or green mucus.    You have severe abdominal pain or your abdomen is larger than usual.    Call your doctor if:   Your symptoms do not get better with treatment or get worse after 3 days.    You have a rash or ear pain.    You have burning when you urinate.    You have questions or concerns about your condition or care.    Medicines:  You may  need any of the following:  Acetaminophen  decreases pain and fever. It is available without a doctor's order. Ask how much to take and how often to take it. Follow directions. Read the labels of all other medicines you are using to see if they also contain acetaminophen, or ask your doctor or pharmacist. Acetaminophen can cause liver damage if not taken correctly.    NSAIDs , such as ibuprofen, help decrease swelling, pain, and fever. NSAIDs can cause stomach bleeding or kidney problems in certain people. If you take blood thinner medicine, always ask your healthcare provider if NSAIDs are safe for you. Always read the medicine label and follow directions.    Cold medicine  helps decrease swelling, control a cough, and relieve chest or nasal congestion.     Saline nasal spray  helps decrease nasal congestion.     Take your medicine  as directed.  Contact your healthcare provider if you think your medicine is not helping or if you have side effects. Tell your provider if you are allergic to any medicine. Keep a list of the medicines, vitamins, and herbs you take. Include the amounts, and when and why you take them. Bring the list or the pill bottles to follow-up visits. Carry your medicine list with you in case of an emergency.    Manage your symptoms:   Drink liquids as directed to prevent dehydration.  Ask how much liquid to drink each day and which liquids are best for you. Do not drink liquids with caffeine. Caffeine can make dehydration worse.     Get plenty of rest to help your body heal.  Take naps throughout the day. Ask your healthcare provider when you can return to work and your normal activities.    Use a cool mist humidifier  to increase air moisture in your home. This may make it easier for you to breathe and help decrease your cough.     Drink tea with honey or use cough drops for a sore throat.  Cough drops are available without a doctor's order. Follow directions for taking cough drops.    Do not smoke or be close to anyone who is smoking.  Nicotine and other chemicals in cigarettes and cigars can cause lung damage. Smoking can also delay healing. Ask your healthcare provider for information if you currently smoke and need help to quit. E-cigarettes or smokeless tobacco still contain nicotine. Talk to your healthcare provider before you use these products.    Prevent the spread of germs:   Wash your hands often throughout the day.  Use soap and water. Rub your soapy hands together, lacing your fingers, for at least 20 seconds. Rinse with warm, running water. Dry your hands with a clean towel or paper towel. Use hand  that contains alcohol if soap and water are not available. Teach children how to wash their hands and use hand .         Cover sneezes and coughs.  Turn your face away and cover your mouth and nose  with a tissue. Throw the tissue away. Use the bend of your arm if a tissue is not available. Then wash your hands well with soap and water or use hand . Teach children how to cover a cough or sneeze.    Stay home while you are sick.  Avoid crowds as much as possible.    Get the influenza (flu) vaccine as soon as recommended each year.  The flu vaccine is available starting in September or October. Ask your healthcare provider about the pneumonia vaccine. This vaccine is usually recommended every 5 years in older adults.       Follow up with your doctor as directed:  Write down your questions so you remember to ask them during your visits.  © Copyright [employer] 2023 Information is for End User's use only and may not be sold, redistributed or otherwise used for commercial purposes.  The above information is an  only. It is not intended as medical advice for individual conditions or treatments. Talk to your doctor, nurse or pharmacist before following any medical regimen to see if it is safe and effective for you.

## 2024-02-14 LAB
FLUAV RNA RESP QL NAA+PROBE: NEGATIVE
FLUBV RNA RESP QL NAA+PROBE: NEGATIVE
SARS-COV-2 RNA RESP QL NAA+PROBE: NEGATIVE

## 2024-02-15 LAB — BACTERIA THROAT CULT: NORMAL

## 2024-02-20 ENCOUNTER — TELEPHONE (OUTPATIENT)
Dept: FAMILY MEDICINE CLINIC | Facility: CLINIC | Age: 19
End: 2024-02-20

## 2024-02-22 ENCOUNTER — TELEMEDICINE (OUTPATIENT)
Dept: PSYCHIATRY | Facility: CLINIC | Age: 19
End: 2024-02-22
Payer: COMMERCIAL

## 2024-02-22 DIAGNOSIS — F41.1 GAD (GENERALIZED ANXIETY DISORDER): ICD-10-CM

## 2024-02-22 DIAGNOSIS — F33.9 DEPRESSION, RECURRENT (HCC): ICD-10-CM

## 2024-02-22 DIAGNOSIS — F41.0 PANIC DISORDER: Primary | ICD-10-CM

## 2024-02-22 PROCEDURE — 99214 OFFICE O/P EST MOD 30 MIN: CPT | Performed by: NURSE PRACTITIONER

## 2024-02-22 PROCEDURE — 90833 PSYTX W PT W E/M 30 MIN: CPT | Performed by: NURSE PRACTITIONER

## 2024-02-27 NOTE — BH CRISIS PLAN
Client Name: Nadia Galeano       Client YOB: 2005    Ольга Safety Plan      Creation Date: 2/22/24 Update Date: 2/22/24   Created By: Aracelis Stoll, PhD       Step 1: Warning Signs:   Warning Signs   body tingling, chest heart dropped            Step 2: Internal Coping Strategies:   Internal Coping Strategies   distract self            Step 3: People and social settings that provide distraction:   Name Contact Information   family cell    Places   watch movie, shower           Step 4: People whom I can ask for help during a crisis:      Name Contact Information    mother cell    grandmother cell      Step 5: Professionals or agencies I can contact during a crisis:      Clinican/Agency Name Phone Emergency Contact    [phone] Dr. Stoll      [facility] Emergency Department Emergency Department Phone Emergency Department Address    [address]          Crisis Phone Numbers:   Suicide Prevention Lifeline: Call or Text  215 Crisis Text Line: Text HOME to 557-357   Please note: Some [facility] do not have a separate number for Child/Adolescent specific crisis. If your county is not listed under Child/Adolescent, please call the adult number for your county      Adult Crisis Numbers: Child/Adolescent Crisis Numbers   [facility]: [phone] [facility]: [phone]   [facility]: [phone] [facility]: [phone]   [facility]: [phone] [place]: [phone]   [facility]: [phone] [facility]: [phone]   [facility]: [phone]   [facility]: [phone]   [facility]: [phone]   [facility]: [phone] (daytime) [phone] (after hours, weekends, holidays)      Step 6: Making the environment safer (plan for lethal means safety):   Patient did not identify any lethal methods: Yes     Optional: What is most important to me and worth living for?   Family and friends.     Ольга  Safety Plan. Elvie Nye and Alex Anderson. Used with permission of the authors.

## 2024-02-27 NOTE — BH TREATMENT PLAN
TREATMENT PLAN (Medication Management Only)         [facility]     Name and Date of Birth:  Nadia TALIA Galeano 18 y.o. 2005  Date of Treatment Plan: October 3, 2020, updated June 22, 2021, December 14/2021, June 2, 2022, January 30, 2023, February 22, 2024  Diagnosis/Diagnoses:    1. Persistent mood (affective) disorder, unspecified (HCC)    2. Panic disorder       Strengths/Personal Resources for Self-Care: supportive family.  Area/Areas of need (in own words): anxiety, depression panic  1.         Long Term Goal: decrease anxiety, panic attacks and depression, increase weight by 10 lbs  Target Date:6 months - 8/22/2024  Person/Persons responsible for completion of goal: Nadia, provider, family  2.         Short Term Objective (s) - How will we reach this goal?:   A. Provider new recommended medication/dosage changes and/or continue medication(s): continue current medications as prescribed (add klonopin, increase buspar to 15 mg BID).  B. coping skills.  C. rest and increased nutrition.  Target Date:6 months -   8/22/2024  Person/Persons Responsible for Completion of Goal: Nadia, provider, family  Progress Towards Goals: continuing treatment  Treatment Modality: medication management every 1-3 months  Review due 180 days from date of this plan: 6 months - 8/22/2024     Expected length of service: maintenance  My Physician/PA/NP and I have developed this plan together and I agree to work on the goals and objectives. I understand the treatment goals that were developed for my treatment.

## 2024-02-27 NOTE — PSYCH
Virtual Regular Visit    Problem List Items Addressed This Visit          Other    Depression, recurrent (HCC)    RAJAN (generalized anxiety disorder)    Panic disorder - Primary     Reason for visit is   Chief Complaint   Patient presents with    Anxiety    Depression    Panic Attack    Medication Management     Encounter provider Aracelis Stoll, PhD    Provider located at [address]  [address]  [facility] [id number] [phone]    Recent Visits  Date Type Provider Dept   02/22/24 Telemedicine Aracelis Stoll, PhD [facility]   02/20/24 Telephone Petty Brewer MD [facility]   Showing recent visits within past 7 days and meeting all other requirements  Future Appointments  No visits were found meeting these conditions.  Showing future appointments within next 150 days and meeting all other requirements       After connecting through televideo, the patient was identified by name and date of birth. Nadia Galeano was informed that this is a telemedicine visit and that the visit is being conducted through the Epic Embedded platform. She agrees to proceed. which may not be secure and therefore, might not be HIPAA-compliant.  My office door was closed. No one else was in the room.  She acknowledged consent and understanding of privacy and security of the video platform. The patient has agreed to participate and understands they can discontinue the visit at any time.    SUBJECTIVE:    Nadia Galeano is a 18 y.o. female with a history of panic disorder, RAJAN, and depression seen for medication management and mood assessment.  Nadia reports she has not experienced panic attacks for a while, no need for Atarax.  She reports eating and sleeping.  And working part-time at a .  She has a good relationship with her boyfriend and family are supportive.  She reports being happy and not stressed.   Medications help manage her moods.  Takes medication as prescribed and has a good support system.    HPI ROS Appetite Changes and Sleep: normal appetite and normal energy level    Review Of Systems:     Mood Normal   Behavior Normal    Thought Content Normal   General Normal    Personality Normal   Other Psych Symptoms Normal   Constitutional Normal   ENT As Noted in HPI   Cardiovascular As Noted in HPI   Respiratory As Noted in HPI   Gastrointestinal As Noted in HPI   Genitourinary As Noted in HPI   Musculoskeletal As Noted in HPI   Integumentary As Noted in HPI   Neurological As Noted in HPI   Endocrine Normal    Other Symptoms Normal        Substance Abuse History:    Social History     Substance and Sexual Activity   Drug Use Not Currently       Family Psychiatric History:     Family History   Problem Relation Age of Onset    Anxiety disorder Mother     Leukemia Father     Diverticulosis Father     Anxiety disorder Maternal Grandmother     Depression Maternal Grandmother     Bipolar disorder Maternal Aunt        Social History     Socioeconomic History    Marital status: Single     Spouse name: Not on file    Number of children: Not on file    Years of education: Not on file    Highest education level: Not on file   Occupational History    Not on file   Tobacco Use    Smoking status: Never     Passive exposure: Never    Smokeless tobacco: Never   Vaping Use    Vaping status: Every Day    Substances: Nicotine   Substance and Sexual Activity    Alcohol use: Never    Drug use: Not Currently    Sexual activity: Yes     Partners: Male   Other Topics Concern    Not on file   Social History Narrative    Not on file     Social Determinants of Health     Financial Resource Strain: Not on file   Food Insecurity: Not on file   Transportation Needs: Not on file   Physical Activity: Not on file   Stress: Not on file   Social Connections: Not on file   Intimate Partner Violence: Not on file   Housing Stability: Not on file        Past Medical History:   Diagnosis Date    Anxiety     Depression     Eating disorder     Panic attack     Urinary tract infection        Past Surgical History:   Procedure Laterality Date    NO PAST SURGERIES         Current Outpatient Medications   Medication Sig Dispense Refill    cariprazine (Vraylar) 3 MG capsule TAKE ONE CAPSULE BY MOUTH EVERY DAY 30 capsule 0    desvenlafaxine (PRISTIQ) 100 mg 24 hr tablet TAKE 1 TABLET BY MOUTH IN THE  MORNING 30 tablet 11     No current facility-administered medications for this visit.        No Known Allergies    The following portions of the patient's history were reviewed and updated as appropriate: allergies, current medications, past family history, past medical history, past social history, past surgical history, and problem list.    OBJECTIVE:     Mental Status Examination:    Appearance calm and cooperative , adequate hygiene and grooming, and good eye contact    Mood euthymic   Affect affect appropriate    Speech a normal rate and sparse   Thought Processes normal thought processes   Hallucinations no hallucinations present    Thought Content no delusions   Abnormal Thoughts no suicidal thoughts  and no homicidal thoughts    Orientation  oriented to person and place and time   Remote Memory short term memory intact and long term memory intact   Attention Span concentration intact   Intellect Appears to be of Average Intelligence   Insight Insight intact   Judgement judgment was intact   Muscle Strength Muscle strength and tone were normal   Language no difficulty naming common objects   Fund of Knowledge displays adequate knowledge of current events   Pain none   Pain Scale 0       Laboratory Results: No results found for this or any previous visit.    Assessment/Plan:       Diagnoses and all orders for this visit:    Panic disorder    RAJAN (generalized anxiety disorder)    Depression, recurrent (HCC)          Treatment Recommendations- Risks Benefits       Immediate Medical/Psychiatric/Psychotherapy Treatments and Any Precautions: Continue treatment plan    Risks, Benefits And Possible Side Effects Of Medications:  {PSYCH RISK, BENEFITS AND POSSIBLE SIDE EFFECTS (Optional):10529     Psychotherapy Provided: 30 minutes  Supportive therapy  Medication evaluation  Mood assessment  Treatment plan updated  Crisis safety plan compiled    Goals discussed in session: Maintain stable mood       Treatment Plan:    Completed and signed during the session: Yes - Treatment Plan done but not signed at time of office visit due to:  Plan reviewed by video and verbal consent given due to virtual visit. Treatment Plan sent to patient via [employer] for signature.      Aracelis Stoll, PhD 02/27/24

## 2024-03-10 DIAGNOSIS — F41.1 GAD (GENERALIZED ANXIETY DISORDER): ICD-10-CM

## 2024-03-10 DIAGNOSIS — F41.0 PANIC DISORDER: ICD-10-CM

## 2024-03-10 DIAGNOSIS — F34.9 PERSISTENT MOOD (AFFECTIVE) DISORDER, UNSPECIFIED (HCC): ICD-10-CM

## 2024-03-11 DIAGNOSIS — F41.0 PANIC DISORDER: ICD-10-CM

## 2024-03-11 DIAGNOSIS — F41.1 GAD (GENERALIZED ANXIETY DISORDER): ICD-10-CM

## 2024-03-11 DIAGNOSIS — F34.9 PERSISTENT MOOD (AFFECTIVE) DISORDER, UNSPECIFIED (HCC): ICD-10-CM

## 2024-03-11 RX ORDER — CARIPRAZINE 3 MG/1
CAPSULE, GELATIN COATED ORAL
Qty: 30 CAPSULE | Refills: 3 | Status: SHIPPED | OUTPATIENT
Start: 2024-03-11 | End: 2024-03-11 | Stop reason: SDUPTHER

## 2024-03-11 NOTE — TELEPHONE ENCOUNTER
Mom states murray Booth hasn't received script for Vraylar 3 mg.  Mom states patient is out of medication.

## 2024-03-12 ENCOUNTER — CLINICAL SUPPORT (OUTPATIENT)
Dept: FAMILY MEDICINE CLINIC | Facility: CLINIC | Age: 19
End: 2024-03-12
Payer: COMMERCIAL

## 2024-03-12 DIAGNOSIS — Z11.1 PPD SCREENING TEST: Primary | ICD-10-CM

## 2024-03-12 PROCEDURE — 86580 TB INTRADERMAL TEST: CPT

## 2024-03-15 ENCOUNTER — CLINICAL SUPPORT (OUTPATIENT)
Dept: FAMILY MEDICINE CLINIC | Facility: CLINIC | Age: 19
End: 2024-03-15

## 2024-03-15 DIAGNOSIS — Z11.1 PPD SCREENING TEST: Primary | ICD-10-CM

## 2024-03-15 LAB
INDURATION: 0 MM
TB SKIN TEST: NEGATIVE

## 2024-04-05 ENCOUNTER — HOSPITAL ENCOUNTER (EMERGENCY)
Facility: HOSPITAL | Age: 19
Discharge: HOME | End: 2024-04-06
Attending: EMERGENCY MEDICINE
Payer: COMMERCIAL

## 2024-04-05 DIAGNOSIS — R00.2 PALPITATIONS: Primary | ICD-10-CM

## 2024-04-05 DIAGNOSIS — J10.1 INFLUENZA A: ICD-10-CM

## 2024-04-05 LAB
ANION GAP SERPL CALCULATED.3IONS-SCNC: 10 MMOL/L (ref 4–13)
BASOPHILS # BLD AUTO: 0.01 THOUSANDS/ÂΜL (ref 0–0.1)
BASOPHILS NFR BLD AUTO: 0 % (ref 0–1)
BUN SERPL-MCNC: 9 MG/DL (ref 5–25)
CALCIUM SERPL-MCNC: 9.3 MG/DL (ref 8.4–10.2)
CHLORIDE SERPL-SCNC: 104 MMOL/L (ref 96–108)
CO2 SERPL-SCNC: 24 MMOL/L (ref 21–32)
CREAT SERPL-MCNC: 1.03 MG/DL (ref 0.6–1.3)
EOSINOPHIL # BLD AUTO: 0 THOUSAND/ÂΜL (ref 0–0.61)
EOSINOPHIL NFR BLD AUTO: 0 % (ref 0–6)
ERYTHROCYTE [DISTWIDTH] IN BLOOD BY AUTOMATED COUNT: 13.5 % (ref 11.6–15.1)
EXT PREGNANCY TEST URINE: NEGATIVE
EXT. CONTROL: NORMAL
FLUAV RNA RESP QL NAA+PROBE: POSITIVE
FLUBV RNA RESP QL NAA+PROBE: NEGATIVE
GFR SERPL CREATININE-BSD FRML MDRD: 78 ML/MIN/1.73SQ M
GLUCOSE SERPL-MCNC: 110 MG/DL (ref 65–140)
HCT VFR BLD AUTO: 34.6 % (ref 34.8–46.1)
HGB BLD-MCNC: 10.7 G/DL (ref 11.5–15.4)
IMM GRANULOCYTES # BLD AUTO: 0.04 THOUSAND/UL (ref 0–0.2)
IMM GRANULOCYTES NFR BLD AUTO: 1 % (ref 0–2)
LYMPHOCYTES # BLD AUTO: 0.18 THOUSANDS/ÂΜL (ref 0.6–4.47)
LYMPHOCYTES NFR BLD AUTO: 3 % (ref 14–44)
MAGNESIUM SERPL-MCNC: 1.7 MG/DL (ref 1.9–2.7)
MCH RBC QN AUTO: 25.4 PG (ref 26.8–34.3)
MCHC RBC AUTO-ENTMCNC: 30.9 G/DL (ref 31.4–37.4)
MCV RBC AUTO: 82 FL (ref 82–98)
MONOCYTES # BLD AUTO: 0.58 THOUSAND/ÂΜL (ref 0.17–1.22)
MONOCYTES NFR BLD AUTO: 9 % (ref 4–12)
NEUTROPHILS # BLD AUTO: 5.48 THOUSANDS/ÂΜL (ref 1.85–7.62)
NEUTS SEG NFR BLD AUTO: 87 % (ref 43–75)
NRBC BLD AUTO-RTO: 0 /100 WBCS
PLATELET # BLD AUTO: 212 THOUSANDS/UL (ref 149–390)
PMV BLD AUTO: 10.5 FL (ref 8.9–12.7)
POTASSIUM SERPL-SCNC: 3.8 MMOL/L (ref 3.5–5.3)
RBC # BLD AUTO: 4.21 MILLION/UL (ref 3.81–5.12)
RSV RNA RESP QL NAA+PROBE: NEGATIVE
S PYO DNA THROAT QL NAA+PROBE: NOT DETECTED
SARS-COV-2 RNA RESP QL NAA+PROBE: NEGATIVE
SODIUM SERPL-SCNC: 138 MMOL/L (ref 135–147)
TSH SERPL DL<=0.05 MIU/L-ACNC: 0.7 UIU/ML (ref 0.45–4.5)
WBC # BLD AUTO: 6.29 THOUSAND/UL (ref 4.31–10.16)

## 2024-04-05 PROCEDURE — 83735 ASSAY OF MAGNESIUM: CPT | Performed by: EMERGENCY MEDICINE

## 2024-04-05 PROCEDURE — 85025 COMPLETE CBC W/AUTO DIFF WBC: CPT | Performed by: EMERGENCY MEDICINE

## 2024-04-05 PROCEDURE — 96361 HYDRATE IV INFUSION ADD-ON: CPT

## 2024-04-05 PROCEDURE — 87651 STREP A DNA AMP PROBE: CPT | Performed by: EMERGENCY MEDICINE

## 2024-04-05 PROCEDURE — 0241U HB NFCT DS VIR RESP RNA 4 TRGT: CPT | Performed by: EMERGENCY MEDICINE

## 2024-04-05 PROCEDURE — 93005 ELECTROCARDIOGRAM TRACING: CPT

## 2024-04-05 PROCEDURE — 99285 EMERGENCY DEPT VISIT HI MDM: CPT | Performed by: EMERGENCY MEDICINE

## 2024-04-05 PROCEDURE — 36415 COLL VENOUS BLD VENIPUNCTURE: CPT | Performed by: EMERGENCY MEDICINE

## 2024-04-05 PROCEDURE — 81003 URINALYSIS AUTO W/O SCOPE: CPT | Performed by: EMERGENCY MEDICINE

## 2024-04-05 PROCEDURE — 96374 THER/PROPH/DIAG INJ IV PUSH: CPT

## 2024-04-05 PROCEDURE — 84443 ASSAY THYROID STIM HORMONE: CPT | Performed by: EMERGENCY MEDICINE

## 2024-04-05 PROCEDURE — 80048 BASIC METABOLIC PNL TOTAL CA: CPT | Performed by: EMERGENCY MEDICINE

## 2024-04-05 PROCEDURE — 99285 EMERGENCY DEPT VISIT HI MDM: CPT

## 2024-04-05 PROCEDURE — 81025 URINE PREGNANCY TEST: CPT | Performed by: EMERGENCY MEDICINE

## 2024-04-05 RX ORDER — KETOROLAC TROMETHAMINE 30 MG/ML
15 INJECTION, SOLUTION INTRAMUSCULAR; INTRAVENOUS ONCE
Status: COMPLETED | OUTPATIENT
Start: 2024-04-05 | End: 2024-04-05

## 2024-04-05 RX ORDER — ACETAMINOPHEN 325 MG/1
650 TABLET ORAL ONCE
Status: COMPLETED | OUTPATIENT
Start: 2024-04-05 | End: 2024-04-05

## 2024-04-05 RX ADMIN — SODIUM CHLORIDE 1000 ML: 0.9 INJECTION, SOLUTION INTRAVENOUS at 22:44

## 2024-04-05 RX ADMIN — KETOROLAC TROMETHAMINE 15 MG: 30 INJECTION, SOLUTION INTRAMUSCULAR; INTRAVENOUS at 23:46

## 2024-04-05 RX ADMIN — ACETAMINOPHEN 650 MG: 325 TABLET, FILM COATED ORAL at 23:45

## 2024-04-06 VITALS
RESPIRATION RATE: 18 BRPM | HEART RATE: 107 BPM | OXYGEN SATURATION: 100 % | SYSTOLIC BLOOD PRESSURE: 102 MMHG | TEMPERATURE: 99.7 F | DIASTOLIC BLOOD PRESSURE: 56 MMHG

## 2024-04-06 LAB
ATRIAL RATE: 130 BPM
BILIRUB UR QL STRIP: NEGATIVE
CLARITY UR: CLEAR
COLOR UR: COLORLESS
GLUCOSE UR STRIP-MCNC: NEGATIVE MG/DL
HGB UR QL STRIP.AUTO: NEGATIVE
KETONES UR STRIP-MCNC: NEGATIVE MG/DL
LEUKOCYTE ESTERASE UR QL STRIP: NEGATIVE
NITRITE UR QL STRIP: NEGATIVE
P AXIS: 71 DEGREES
PH UR STRIP.AUTO: 5.5 [PH]
PR INTERVAL: 150 MS
PROT UR STRIP-MCNC: NEGATIVE MG/DL
QRS AXIS: 90 DEGREES
QRSD INTERVAL: 102 MS
QT INTERVAL: 292 MS
QTC INTERVAL: 429 MS
SP GR UR STRIP.AUTO: 1 (ref 1–1.03)
T WAVE AXIS: 17 DEGREES
UROBILINOGEN UR STRIP-ACNC: <2 MG/DL
VENTRICULAR RATE: 130 BPM

## 2024-04-06 PROCEDURE — 93010 ELECTROCARDIOGRAM REPORT: CPT | Performed by: INTERNAL MEDICINE

## 2024-04-06 NOTE — ED ATTENDING ATTESTATION
4/5/2024  IFlakita MD, saw and evaluated the patient. I have discussed the patient with the resident/non-physician practitioner and agree with the resident's/non-physician practitioner's findings, Plan of Care, and MDM as documented in the resident's/non-physician practitioner's note, except where noted. All available labs and Radiology studies were reviewed.  I was present for key portions of any procedure(s) performed by the resident/non-physician practitioner and I was immediately available to provide assistance.       At this point I agree with the current assessment done in the Emergency Department.  I have conducted an independent evaluation of this patient a history and physical is as follows:    18-year-old female with a history of anxiety and depression presenting with her mom for evaluation of headache, body aches, and fast heart rate.  Patient states her symptoms started this morning.  States she was cleaning when she felt body aches and like her heart was racing.  She was noted her heart rate was in the 130s to 140s.  Patient notes subjective chills but denies fever in addition to rhinorrhea and some postnasal drip.  Denies cough, chest pain, shortness of breath, nausea, vomiting, diarrhea, abdominal pain, dysuria, hematuria, leg swelling.  Last menstrual cycle was 2 weeks ago.  Patient states she took ibuprofen with some improvement in her symptoms.  Denies personal history of VTE, exogenous estrogen use, leg swelling, hemoptysis, recent travel, surgery, immobility, history of malignancy.    Please see resident documentation for histories and review of systems.    Exam: Vital signs and nursing notes reviewed  General: Awake, alert, no acute distress  HEENT: Normocephalic, atraumatic, mucous membranes moist  Neck: Supple  Heart: Tachycardic but regular  Lungs: Clear to auscultation bilaterally without wheezing, rales, or rhonchi  Abdomen: Soft, nontender, nondistended, no rebound or  guarding  Extremities: No swelling or deformity.  Diffuse mild tenderness to palpation  Skin: Warm, dry, intact, no rash  Neuro: No gross motor deficits  Psych: Anxious    ED Course  ED Course as of 24 0125    WBC: 6.29   2303 Hemoglobin(!): 10.7   2303 Platelet Count: 212   2303 Neutrophils %(!): 87   2303 Pulse(!): 138   2303 Pulse(!): 129   2303 Pulse(!): 115   2321 MAGNESIUM(!): 1.7   2321 Basic metabolic panel  Grossly normal    SARS-COV-2: Negative    INFLU A PCR(!): Positive    INFLU B PCR: Negative    RSV PCR: Negative    TSH 3RD GENERATON: 0.702    STREP A PCR: Not Detected     ECG 12 Lead Documentation Only    Date/Time: 2024 11:05 PM    Performed by: Flakita Barraza MD  Authorized by: Flakita Barraza MD    Indications / Diagnosis:  Palpiations  ECG reviewed by me, the ED Provider: yes    Patient location:  ED  Previous ECG:     Previous ECG:  Unavailable  Interpretation:     Interpretation: abnormal    Rate:     ECG rate:  130    ECG rate assessment: tachycardic    Rhythm:     Rhythm: sinus tachycardia    Ectopy:     Ectopy: none    QRS:     QRS axis:  Right    QRS intervals:  Normal  Conduction:     Conduction: normal    ST segments:     ST segments:  Normal  T waves:     T waves: normal      ED course/medical decision makin-year-old female presenting for evaluation of headache, body aches, and palpitations.  Differential diagnosis includes viral syndrome, influenza, COVID-19, other viral process, thyroid dysfunction, electrolyte derangement, anemia.  Patient is tachycardic on arrival and EKG shows sinus tachycardia with a right axis but no evidence of ischemia or malignant dysrhythmia.  Do not suspect acute coronary syndrome at this time.  Patient is not PERC negative given her tachycardia, but she otherwise does not have risk factors for pulmonary embolism.  She is low risk according to Wells criteria.  Do not feel  she requires further workup for this etiology at this time.  Discussed D-dimer testing with patient and mother at the bedside; patient initially elected to obtain the D-dimer.  However, patient's viral swab returned positive for influenza A, which I suspect is the cause of her symptoms.  Patient was comfortable with not obtaining D-dimer at this time.  No other signs or symptoms of DVT on exam.  Patient has mild hypomagnesemia with otherwise grossly normal electrolytes and renal function.  She has no leukocytosis but left shift, likely secondary to acute illness.  Patient is also anemic; strongly encouraged to follow-up with PCP for iron studies and further management.  Patient given IV fluid hydration, ketorolac, and acetaminophen with improvement in her symptoms and heart rate.  At this time, patient is appropriate for discharge home with outpatient follow-up.  Counseled on supportive measures and adequate hydration.  Return precautions discussed.  Patient is in agreement and understanding of these instructions.    Diagnosis: Influenza A, mild hypomagnesemia, anemia  Disposition: Discharge

## 2024-04-06 NOTE — ED PROVIDER NOTES
History  Chief Complaint   Patient presents with    Palpitations     Pt presents to the ED for evaluation of palpitations with associated body aches, rhinorrhea, and headache. States her headache feels similar as if she missed her anxiety medications but she does not typically have the additional symptoms. States she is unsure if she took her anxiety medications last night.      18 year old female with PMHx significant for anxiety and depression controlled with cariprazine and desvenlafaxine presented to the emergency department with complaint of fast heart rate. 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 States that she thought she was having headache since morning and took advil with relief. She also cannot recall if she took her anti anxiety medications last night and states feeling stressed at work today. Endorses clear rhinorrhea and post nasal drip, but denies sore throat, cough, chest congestion, chills, fever, nausea, vomiting, diarrhea, dysuria. She recently traveled to [place] last week and noted having 2 red and 3 white small papules on her right wrist which have since resolved. She denies any sick contacts. She endorses having to walk at work but otherwise has a sedentary lifestyle. Diet involves home cooked food. No family history for medical disease or sudden cardiac death noted.      Palpitations  Palpitations quality:  Regular  Onset quality:  Sudden  Duration:  6 hours  Timing:  Constant  Progression:  Unchanged  Associated symptoms: no chest pain, no cough, no dizziness, no nausea, no shortness of breath, no vomiting and no weakness           Prior to Admission Medications   Prescriptions Last Dose Informant Patient Reported? Taking?   cariprazine (Vraylar) 3 MG capsule   No No   Sig: Take 1 capsule (3 mg total) by mouth daily   desvenlafaxine (PRISTIQ) 100 mg 24 hr  tablet   No No   Sig: TAKE 1 TABLET BY MOUTH IN THE  MORNING      Facility-Administered Medications: None       Past Medical History:   Diagnosis Date    Anxiety     Depression     Eating disorder     Panic attack     Urinary tract infection        Past Surgical History:   Procedure Laterality Date    NO PAST SURGERIES         Family History   Problem Relation Age of Onset    Anxiety disorder Mother     Leukemia Father     Diverticulosis Father     Anxiety disorder Maternal Grandmother     Depression Maternal Grandmother     Bipolar disorder Maternal Aunt      I have reviewed and agree with the history as documented.    E-Cigarette/Vaping    E-Cigarette Use Current Every Day User      E-Cigarette/Vaping Substances    Nicotine Yes     THC No     CBD No     Flavoring No     Other No     Unknown No      Social History     Tobacco Use    Smoking status: Never     Passive exposure: Never    Smokeless tobacco: Current   Vaping Use    Vaping status: Every Day    Substances: Nicotine   Substance Use Topics    Alcohol use: Never    Drug use: Not Currently        Review of Systems   Constitutional:  Positive for fatigue. Negative for chills and fever.   HENT:  Positive for rhinorrhea. Negative for congestion, sore throat and trouble swallowing.    Eyes:  Negative for photophobia.   Respiratory:  Negative for cough, chest tightness and shortness of breath.    Cardiovascular:  Positive for palpitations. Negative for chest pain.   Gastrointestinal:  Negative for abdominal distention, diarrhea, nausea and vomiting.   Genitourinary:  Negative for difficulty urinating and menstrual problem.   Musculoskeletal:  Positive for myalgias.   Skin:  Negative for rash.   Neurological:  Positive for light-headedness and headaches. Negative for dizziness and weakness.   Psychiatric/Behavioral:  Negative for confusion. The patient is nervous/anxious.    All other systems reviewed and are negative.        Physical Activity Assessment and  Intervention:    Exercise capacity assessment reviewed      Other interventions: Discussed to incorporate 30 minutes of brisk walk to her daily routine, and start by trying to walk in 10 minute intervals, for long term cardiac and health benefits.       Physical Exam  ED Triage Vitals   Temperature Pulse Respirations Blood Pressure SpO2   04/05/24 2103 04/05/24 2103 04/05/24 2103 04/05/24 2103 04/05/24 2103   99.7 °F (37.6 °C) (!) 138 18 117/61 97 %      Temp Source Heart Rate Source Patient Position - Orthostatic VS BP Location FiO2 (%)   04/05/24 2103 04/05/24 2103 04/05/24 2103 04/05/24 2103 --   Oral Monitor Sitting Right arm       Pain Score       04/05/24 2345       2             Orthostatic Vital Signs  Vitals:    04/05/24 2103 04/05/24 2105 04/05/24 2130 04/05/24 2154   BP: 117/61 117/61 99/58    Pulse: (!) 138  (!) 129 (!) 115   Patient Position - Orthostatic VS: Sitting Sitting         Physical Exam  Constitutional:       Appearance: She is normal weight. She is ill-appearing.   HENT:      Head: Normocephalic.      Mouth/Throat:      Mouth: Mucous membranes are moist.      Pharynx: Oropharynx is clear.   Eyes:      Extraocular Movements: Extraocular movements intact.      Pupils: Pupils are equal, round, and reactive to light.   Cardiovascular:      Rate and Rhythm: Regular rhythm. Tachycardia present.      Heart sounds: No murmur heard.  Pulmonary:      Effort: Pulmonary effort is normal.      Breath sounds: Normal breath sounds. No wheezing or rales.   Abdominal:      General: Abdomen is flat. Bowel sounds are normal.      Palpations: Abdomen is soft.   Musculoskeletal:         General: No swelling or tenderness.      Cervical back: No rigidity or tenderness.   Skin:     General: Skin is warm.      Coloration: Skin is not jaundiced or pale.      Findings: No rash.   Neurological:      General: No focal deficit present.      Mental Status: She is alert. Mental status is at baseline.   Psychiatric:          Mood and Affect: Mood is anxious. Affect is tearful.         Behavior: Behavior normal. Behavior is cooperative.         ED Medications  Medications   sodium chloride 0.9 % bolus 1,000 mL (1,000 mL Intravenous New Bag 4/5/24 2244)   ketorolac (TORADOL) injection 15 mg (15 mg Intravenous Given 4/5/24 2346)   acetaminophen (TYLENOL) tablet 650 mg (650 mg Oral Given 4/5/24 2345)       Diagnostic Studies  Results Reviewed       Procedure Component Value Units Date/Time    FLU/RSV/COVID - if FLU/RSV clinically relevant [[id number]]  (Abnormal) Collected: 04/05/24 2243    Lab Status: Final result Specimen: Nares from Nose Updated: 04/05/24 2338     SARS-CoV-2 Negative     INFLUENZA A PCR Positive     INFLUENZA B PCR Negative     RSV PCR Negative    Narrative:      FOR PEDIATRIC PATIENTS - copy/paste COVID Guidelines URL to browser: [email or web address]    SARS-CoV-2 assay is a Nucleic Acid Amplification assay intended for the  qualitative detection of nucleic acid from SARS-CoV-2 in nasopharyngeal  swabs. Results are for the presumptive identification of SARS-CoV-2 RNA.    Positive results are indicative of infection with SARS-CoV-2, the virus  causing COVID-19, but do not rule out bacterial infection or co-infection  with other viruses. Laboratories within the [place] and its  territories are required to report all positive results to the appropriate  public health authorities. Negative results do not preclude SARS-CoV-2  infection and should not be used as the sole basis for treatment or other  patient management decisions. Negative results must be combined with  clinical observations, patient history, and epidemiological information.  This test has not been FDA cleared or approved.    This test has been authorized by FDA under an Emergency Use Authorization  (EUA). This test is only authorized for the duration of time the  declaration that circumstances exist  justifying the authorization of the  emergency use of an in vitro diagnostic tests for detection of SARS-CoV-2  virus and/or diagnosis of COVID-19 infection under section 564(b)(1) of  the Act, 21 U.S.C. 360bbb-3(b)(1), unless the authorization is terminated  or revoked sooner. The test has been validated but independent review by FDA  and CLIA is pending.    Test performed using [employer] GeneXpert: This RT-PCR assay targets N2,  a region unique to SARS-CoV-2. A conserved region in the E-gene was chosen  for pan-Sarbecovirus detection which includes SARS-CoV-2.    According to CMS-2020-01-R, this platform meets the definition of high-throughput technology.    Strep A PCR [[id number]]  (Normal) Collected: 04/05/24 2243    Lab Status: Final result Specimen: Throat Updated: 04/05/24 2325     STREP A PCR Not Detected    TSH, 3rd generation with Free T4 reflex [[id number]]  (Normal) Collected: 04/05/24 2241    Lab Status: Final result Specimen: Blood from Arm, Right Updated: 04/05/24 2322     TSH 3RD GENERATON 0.702 uIU/mL     D-Dimer [[id number]]     Lab Status: No result Specimen: Blood     Basic metabolic panel [[id number]] Collected: 04/05/24 2241    Lab Status: Final result Specimen: Blood from Arm, Right Updated: 04/05/24 2307     Sodium 138 mmol/L      Potassium 3.8 mmol/L      Chloride 104 mmol/L      CO2 24 mmol/L      ANION GAP 10 mmol/L      BUN 9 mg/dL      Creatinine 1.03 mg/dL      Glucose 110 mg/dL      Calcium 9.3 mg/dL      eGFR 78 ml/min/1.73sq m     Narrative:      National Kidney Disease Foundation guidelines for Chronic Kidney Disease (CKD):     Stage 1 with normal or high GFR (GFR > 90 mL/min/1.73 square meters)    Stage 2 Mild CKD (GFR = 60-89 mL/min/1.73 square meters)    Stage 3A Moderate CKD (GFR = 45-59 mL/min/1.73 square meters)    Stage 3B Moderate CKD (GFR = 30-44 mL/min/1.73 square meters)    Stage 4 Severe CKD (GFR = 15-29 mL/min/1.73 square meters)    Stage 5 End Stage CKD (GFR <15 mL/min/1.73  square meters)  Note: GFR calculation is accurate only with a steady state creatinine    Magnesium [[id number]]  (Abnormal) Collected: 04/05/24 2241    Lab Status: Final result Specimen: Blood from Arm, Right Updated: 04/05/24 2307     Magnesium 1.7 mg/dL     POCT pregnancy, urine [[id number]]     Lab Status: No result     CBC and differential [[id number]]  (Abnormal) Collected: 04/05/24 2241    Lab Status: Final result Specimen: Blood from Arm, Right Updated: 04/05/24 2254     WBC 6.29 Thousand/uL      RBC 4.21 Million/uL      Hemoglobin 10.7 g/dL      Hematocrit 34.6 %      MCV 82 fL      MCH 25.4 pg      MCHC 30.9 g/dL      RDW 13.5 %      MPV 10.5 fL      Platelets 212 Thousands/uL      nRBC 0 /100 WBCs      Neutrophils Relative 87 %      Immature Grans % 1 %      Lymphocytes Relative 3 %      Monocytes Relative 9 %      Eosinophils Relative 0 %      Basophils Relative 0 %      Neutrophils Absolute 5.48 Thousands/µL      Absolute Immature Grans 0.04 Thousand/uL      Absolute Lymphocytes 0.18 Thousands/µL      Absolute Monocytes 0.58 Thousand/µL      Eosinophils Absolute 0.00 Thousand/µL      Basophils Absolute 0.01 Thousands/µL     UA (URINE) with reflex to Scope [[id number]]     Lab Status: No result Specimen: Urine                    No orders to display         Procedures  Procedures      ED Course                     PERC Rule for PE      Flowsheet Row Most Recent Value   PERC Rule for PE    Age >=50 0 Filed at: 04/05/2024 2304   HR >=100 1 Filed at: 04/05/2024 2304   O2 Sat on room air < 95% 0 Filed at: 04/05/2024 2304   History of PE or DVT 0 Filed at: 04/05/2024 2304   Recent trauma or surgery 0 Filed at: 04/05/2024 2304   Hemoptysis 0 Filed at: 04/05/2024 2304   Exogenous estrogen 0 Filed at: 04/05/2024 2304   Unilateral leg swelling 0 Filed at: 04/05/2024 2304   PERC Rule for PE Results 1 Filed at: 04/05/2024 2304                    Wells' Criteria for PE      Flowsheet Row Most Recent Value   Francia  Criteria for PE    Clinical signs and symptoms of DVT 0 Filed at: 04/05/2024 2317   PE is primary diagnosis or equally likely 0 Filed at: 04/05/2024 2317   HR >100 1.5 Filed at: 04/05/2024 2317   Immobilization at least 3 days or Surgery in the previous 4 weeks 0 Filed at: 04/05/2024 2317   Previous, objectively diagnosed PE or DVT 0 Filed at: 04/05/2024 2317   Hemoptysis 0 Filed at: 04/05/2024 2317   Malignancy with treatment within 6 months or palliative 0 Filed at: 04/05/2024 2317   Wells' Criteria Total 1.5 Filed at: 04/05/2024 2317              Medical Decision Making  Flu A positive explains generalized bodyache and palpitations.  Pending D-dimer for PE rule out. Patient will be informed by call from ED if positive.  Discharge home with supportive treatment with fluids and tylenol/advil/aleve for pain and fever. Rest for recovery.    Problems Addressed:  Influenza A: acute illness or injury  Palpitations: acute illness or injury    Amount and/or Complexity of Data Reviewed  Labs: ordered.    Risk  OTC drugs.  Prescription drug management.          Disposition  Final diagnoses:   Palpitations   Influenza A     Time reflects when diagnosis was documented in both MDM as applicable and the Disposition within this note       Time User Action Codes Description Comment    4/5/2024 11:45 PM Felipe Sears Add [R00.2] Palpitations     4/5/2024 11:45 PM Felipe Sears Add [J10.1] Influenza A           ED Disposition       ED Disposition   Discharge    Condition   Stable    Date/Time   Fri Apr 5, 2024 8506    Comment   Nadia TALIA Galeano discharge to home/self care.                   Follow-up Information    None         Patient's Medications   Discharge Prescriptions    No medications on file     No discharge procedures on file.    PDMP Review       None             ED Provider  Attending physically available and evaluated Nadia Galeano. I managed the patient along with the ED Attending.    Electronically Signed by            Felipe Sears MD  04/05/24 3012       Felipe Sears MD  04/06/24 8875

## 2024-04-10 ENCOUNTER — TELEPHONE (OUTPATIENT)
Dept: FAMILY MEDICINE CLINIC | Facility: CLINIC | Age: 19
End: 2024-04-10

## 2024-04-10 ENCOUNTER — TELEPHONE (OUTPATIENT)
Age: 19
End: 2024-04-10

## 2024-04-10 NOTE — TELEPHONE ENCOUNTER
150 is really high.  If it is sustained at that level, she really should go back to the ER.   Would say we need to triage for more information- how long has she been monitoring this and how long has it been this high.  Can also try to verify accuracy with a pulse oximeter if they have one. FAMILIA Rice

## 2024-04-10 NOTE — TELEPHONE ENCOUNTER
The patients mom called the patient was in the er at [facility] last Friday with the flu and an elevated heart rate     the patient is feeling better but she called her mom from work to let her know her heart rate  is  150 beats per minute per her apple watch    she is scheduled for an appointment tomorrow morning   she is not having any other symptoms    mom is just concerned   can this wait till morning? Is this something to worry about   please call the patients mom the patient cannot take calls at work  thank you

## 2024-04-10 NOTE — TELEPHONE ENCOUNTER
Spoke to patients mother she will be taking her daughter to the ER once she gets out of work. ANNIE Cosby, JAGDISH

## 2024-06-11 ENCOUNTER — OFFICE VISIT (OUTPATIENT)
Dept: FAMILY MEDICINE CLINIC | Facility: CLINIC | Age: 19
End: 2024-06-11
Payer: COMMERCIAL

## 2024-06-11 VITALS
HEIGHT: 67 IN | DIASTOLIC BLOOD PRESSURE: 60 MMHG | BODY MASS INDEX: 21.03 KG/M2 | HEART RATE: 96 BPM | WEIGHT: 134 LBS | RESPIRATION RATE: 16 BRPM | SYSTOLIC BLOOD PRESSURE: 90 MMHG | TEMPERATURE: 97.4 F

## 2024-06-11 DIAGNOSIS — D64.9 ANEMIA, UNSPECIFIED TYPE: ICD-10-CM

## 2024-06-11 DIAGNOSIS — R00.2 PALPITATIONS: Primary | ICD-10-CM

## 2024-06-11 DIAGNOSIS — I45.10 INCOMPLETE RBBB: ICD-10-CM

## 2024-06-11 DIAGNOSIS — E83.42 HYPOMAGNESEMIA: ICD-10-CM

## 2024-06-11 PROCEDURE — 99214 OFFICE O/P EST MOD 30 MIN: CPT | Performed by: FAMILY MEDICINE

## 2024-06-11 PROCEDURE — 93000 ELECTROCARDIOGRAM COMPLETE: CPT | Performed by: FAMILY MEDICINE

## 2024-06-11 NOTE — PROGRESS NOTES
Assessment/Plan:    No problem-specific Assessment & Plan notes found for this encounter.    Palpitations/tachycardia  Question accuracy of her watch  Check labs  ER records reviewed  IRBBB stable on repeat EKG today  Check echo  Cardio for holter monitor  Mild anemia, check iron before any supplementation     Diagnoses and all orders for this visit:    Palpitations  -     POCT ECG  -     Ambulatory referral to Cardiology; Future  -     Echo complete w/ contrast if indicated; Future    Incomplete RBBB    Anemia, unspecified type  -     TIBC Panel (incl. Iron, TIBC, % Iron Saturation); Future    Hypomagnesemia        Return if symptoms worsen or fail to improve.    Subjective:      Patient ID: Nadia Galeano is a 18 y.o. female.    Chief Complaint   Patient presents with    Heartrate inconsistency     Ofe Valdes MA        HPI  Been to hosp for same  HR on watch 144 in April  Lately 130s at rest  No symptoms  Lasted 5 min  Not dizzy  Could drink more water  No caffeine really  2 months  3 or 4 days of week, several times a day  Stressed at work  Same meds for years  No symptoms with exertion much, maybe palpitations  Mg 1.7  Hgb 10.7  Heavy periods x 2d then ok    The following portions of the patient's history were reviewed and updated as appropriate: allergies, current medications, past family history, past medical history, past social history, past surgical history and problem list.    Review of Systems   Constitutional:  Negative for chills and fever.   Cardiovascular:  Positive for palpitations. Negative for chest pain.         Current Outpatient Medications   Medication Sig Dispense Refill    cariprazine (Vraylar) 3 MG capsule Take 1 capsule (3 mg total) by mouth daily 30 capsule 3    desvenlafaxine (PRISTIQ) 100 mg 24 hr tablet TAKE 1 TABLET BY MOUTH IN THE  MORNING 30 tablet 11     No current facility-administered medications for this visit.       Objective:    BP 90/60   Pulse 96   Temp (!) 97.4 °F  "(36.3 °C)   Resp 16   Ht 5' 7\" (1.702 m)   Wt 60.8 kg (134 lb)   BMI 20.99 kg/m²        Physical Exam  Vitals and nursing note reviewed.   Constitutional:       General: She is not in acute distress.     Appearance: She is well-developed. She is not ill-appearing.   HENT:      Head: Normocephalic.      Right Ear: Tympanic membrane and ear canal normal.      Left Ear: Tympanic membrane and ear canal normal.   Eyes:      Conjunctiva/sclera: Conjunctivae normal.   Neck:      Thyroid: No thyromegaly.      Vascular: No carotid bruit.   Cardiovascular:      Rate and Rhythm: Normal rate and regular rhythm.      Heart sounds: No murmur heard.  Pulmonary:      Effort: Pulmonary effort is normal. No respiratory distress.      Breath sounds: No wheezing.   Abdominal:      General: There is no distension.      Palpations: Abdomen is soft.      Tenderness: There is no abdominal tenderness.   Musculoskeletal:         General: No deformity.      Cervical back: Neck supple. No tenderness.      Right lower leg: No edema.      Left lower leg: No edema.   Skin:     General: Skin is warm and dry.      Coloration: Skin is not jaundiced or pale.   Neurological:      Mental Status: She is alert.      Motor: No weakness.      Gait: Gait normal.   Psychiatric:         Mood and Affect: Mood normal.         Behavior: Behavior normal.         Thought Content: Thought content normal.                Travon Dupree DO    "

## 2024-06-11 NOTE — PATIENT INSTRUCTIONS
Please drink plenty of water.  Please try an over the counter magnesium supplement daily to see if it helps.  Please get nonfasting labwork done to check iron levels before you start any.  It would be worthwhile to see a cardiologist for the possibility of a heart monitor if you symptoms do not resolve.

## 2024-06-28 ENCOUNTER — HOSPITAL ENCOUNTER (OUTPATIENT)
Dept: NON INVASIVE DIAGNOSTICS | Facility: HOSPITAL | Age: 19
Discharge: HOME | End: 2024-06-28
Attending: FAMILY MEDICINE
Payer: COMMERCIAL

## 2024-06-28 VITALS
DIASTOLIC BLOOD PRESSURE: 76 MMHG | SYSTOLIC BLOOD PRESSURE: 113 MMHG | BODY MASS INDEX: 21.03 KG/M2 | HEART RATE: 77 BPM | WEIGHT: 134 LBS | HEIGHT: 67 IN

## 2024-06-28 DIAGNOSIS — R00.2 PALPITATIONS: ICD-10-CM

## 2024-06-28 LAB
AORTIC ROOT: 2.8 CM
AV LVOT PEAK GRADIENT: 5 MMHG
AV PEAK GRADIENT: 6 MMHG
BSA FOR ECHO PROCEDURE: 1.71 M2
DOP CALC LVOT AREA: 2.83 CM2
DOP CALC LVOT DIAMETER: 1.9 CM
E WAVE DECELERATION TIME: 107 MS
E/A RATIO: 1.32
FRACTIONAL SHORTENING: 39 (ref 28–44)
INTERVENTRICULAR SEPTUM IN DIASTOLE (PARASTERNAL SHORT AXIS VIEW): 0.5 CM
INTERVENTRICULAR SEPTUM: 0.5 CM (ref 0.6–1.1)
LAAS-AP2: 13.5 CM2
LAAS-AP4: 12.4 CM2
LEFT ATRIUM AREA SYSTOLE SINGLE PLANE A4C: 12 CM2
LEFT ATRIUM SIZE: 2.9 CM
LEFT ATRIUM VOLUME (MOD BIPLANE): 28 ML
LEFT ATRIUM VOLUME INDEX (MOD BIPLANE): 16.4 ML/M2
LEFT INTERNAL DIMENSION IN SYSTOLE: 2.8 CM (ref 2.1–4)
LEFT VENTRICULAR INTERNAL DIMENSION IN DIASTOLE: 4.6 CM (ref 3.5–6)
LEFT VENTRICULAR POSTERIOR WALL IN END DIASTOLE: 0.6 CM
LEFT VENTRICULAR STROKE VOLUME: 68 ML
LVSV (TEICH): 68 ML
MV E'TISSUE VEL-LAT: 22 CM/S
MV E'TISSUE VEL-SEP: 13 CM/S
MV PEAK A VEL: 0.56 M/S
MV PEAK E VEL: 74 CM/S
MV STENOSIS PRESSURE HALF TIME: 31 MS
MV VALVE AREA P 1/2 METHOD: 7.1
PV PEAK GRADIENT: 2 MMHG
RIGHT ATRIUM AREA SYSTOLE A4C: 9.7 CM2
RIGHT VENTRICLE ID DIMENSION: 2.5 CM
SL CV LEFT ATRIUM LENGTH A2C: 4.6 CM
SL CV PED ECHO LEFT VENTRICLE DIASTOLIC VOLUME (MOD BIPLANE) 2D: 98 ML
SL CV PED ECHO LEFT VENTRICLE SYSTOLIC VOLUME (MOD BIPLANE) 2D: 30 ML
TRICUSPID ANNULAR PLANE SYSTOLIC EXCURSION: 1.9 CM

## 2024-06-28 PROCEDURE — 93306 TTE W/DOPPLER COMPLETE: CPT | Performed by: INTERNAL MEDICINE

## 2024-06-28 PROCEDURE — 93306 TTE W/DOPPLER COMPLETE: CPT

## 2024-07-03 ENCOUNTER — TELEPHONE (OUTPATIENT)
Dept: PSYCHIATRY | Facility: CLINIC | Age: 19
End: 2024-07-03

## 2024-07-03 DIAGNOSIS — F34.9 PERSISTENT MOOD (AFFECTIVE) DISORDER, UNSPECIFIED (HCC): ICD-10-CM

## 2024-07-03 DIAGNOSIS — F41.0 PANIC DISORDER: ICD-10-CM

## 2024-07-03 DIAGNOSIS — F41.1 GAD (GENERALIZED ANXIETY DISORDER): ICD-10-CM

## 2024-07-03 RX ORDER — CARIPRAZINE 3 MG/1
3 CAPSULE, GELATIN COATED ORAL DAILY
Qty: 30 CAPSULE | Refills: 3 | Status: SHIPPED | OUTPATIENT
Start: 2024-07-03

## 2024-07-07 ENCOUNTER — OFFICE VISIT (OUTPATIENT)
Dept: URGENT CARE | Facility: CLINIC | Age: 19
End: 2024-07-07
Payer: COMMERCIAL

## 2024-07-07 VITALS
WEIGHT: 141.6 LBS | BODY MASS INDEX: 22.18 KG/M2 | OXYGEN SATURATION: 100 % | SYSTOLIC BLOOD PRESSURE: 119 MMHG | DIASTOLIC BLOOD PRESSURE: 62 MMHG | RESPIRATION RATE: 16 BRPM | HEART RATE: 86 BPM | TEMPERATURE: 97.7 F

## 2024-07-07 DIAGNOSIS — J02.9 SORE THROAT: Primary | ICD-10-CM

## 2024-07-07 LAB — S PYO AG THROAT QL: NEGATIVE

## 2024-07-07 PROCEDURE — 99213 OFFICE O/P EST LOW 20 MIN: CPT | Performed by: PHYSICIAN ASSISTANT

## 2024-07-07 PROCEDURE — 87070 CULTURE OTHR SPECIMN AEROBIC: CPT | Performed by: PHYSICIAN ASSISTANT

## 2024-07-07 PROCEDURE — 87880 STREP A ASSAY W/OPTIC: CPT | Performed by: PHYSICIAN ASSISTANT

## 2024-07-07 PROCEDURE — 87636 SARSCOV2 & INF A&B AMP PRB: CPT | Performed by: PHYSICIAN ASSISTANT

## 2024-07-07 RX ORDER — BENZONATATE 100 MG/1
100 CAPSULE ORAL 3 TIMES DAILY PRN
Qty: 30 CAPSULE | Refills: 0 | Status: SHIPPED | OUTPATIENT
Start: 2024-07-07

## 2024-07-07 NOTE — PROGRESS NOTES
[facility]        NAME: Nadia Galeano is a 18 y.o. female  : 2005    MRN: [id number]  DATE: 2024  TIME: 3:38 PM    Assessment and Plan   Sore throat [J02.9]  1. Sore throat  POCT rapid ANTIGEN strepA    Covid/Flu- Office Collect Normal    Throat culture    benzonatate (TESSALON PERLES) 100 mg capsule            Patient Instructions     Patient Instructions   - rapid strep test   Will send out COVID/FLU test       Follow up with PCP in 3-5 days.  Proceed to  ER if symptoms worsen.    Chief Complaint     Chief Complaint   Patient presents with    Sore Throat     States she woke up with a sore throat, productive cough and congestion. Denies body aches. Concerned she may have covid.           History of Present Illness       The patient is a 18-year-old female presenting today for sore throat, productive cough and nasal congestion.  Denies any fevers or myalgias.  Is concerned for COVID.         Review of Systems   Review of Systems   Constitutional:  Negative for activity change, appetite change, chills, fatigue and fever.   HENT:  Positive for congestion and sore throat. Negative for ear pain, rhinorrhea, sinus pressure and sinus pain.    Eyes:  Negative for pain and visual disturbance.   Respiratory:  Positive for cough. Negative for chest tightness and shortness of breath.    Cardiovascular:  Negative for chest pain and palpitations.   Gastrointestinal:  Negative for abdominal pain, diarrhea, nausea and vomiting.   Genitourinary:  Negative for dysuria and hematuria.   Musculoskeletal:  Negative for arthralgias, back pain and myalgias.   Skin:  Negative for color change, pallor and rash.   Neurological:  Negative for seizures, syncope and headaches.   All other systems reviewed and are negative.        Current Medications       Current Outpatient Medications:     benzonatate (TESSALON PERLES) 100 mg capsule, Take 1 capsule (100 mg total) by mouth 3 (three) times a day as needed for cough,  Disp: 30 capsule, Rfl: 0    desvenlafaxine (PRISTIQ) 100 mg 24 hr tablet, TAKE 1 TABLET BY MOUTH IN THE  MORNING, Disp: 30 tablet, Rfl: 11    Vraylar 3 MG capsule, TAKE ONE CAPSULE BY MOUTH EVERY DAY, Disp: 30 capsule, Rfl: 3    Current Allergies     Allergies as of 07/07/2024    (No Known Allergies)            The following portions of the patient's history were reviewed and updated as appropriate: allergies, current medications, past family history, past medical history, past social history, past surgical history and problem list.     Past Medical History:   Diagnosis Date    Anxiety     Depression     Eating disorder     Panic attack     Urinary tract infection        Past Surgical History:   Procedure Laterality Date    NO PAST SURGERIES         Family History   Problem Relation Age of Onset    Anxiety disorder Mother     Leukemia Father     Diverticulosis Father     Anxiety disorder Maternal Grandmother     Depression Maternal Grandmother     Bipolar disorder Maternal Aunt          Medications have been verified.        Objective   /62   Pulse 86   Temp 97.7 °F (36.5 °C) (Temporal)   Resp 16   Wt 64.2 kg (141 lb 9.6 oz)   SpO2 100%   BMI 22.18 kg/m²        Physical Exam     Physical Exam  Vitals and nursing note reviewed.   Constitutional:       General: She is not in acute distress.     Appearance: Normal appearance. She is well-developed and normal weight. She is not ill-appearing, toxic-appearing or diaphoretic.   HENT:      Head: Normocephalic and atraumatic.      Right Ear: Tympanic membrane and ear canal normal. No drainage, swelling or tenderness. No middle ear effusion. Tympanic membrane is not erythematous.      Left Ear: Tympanic membrane and ear canal normal. No drainage, swelling or tenderness.  No middle ear effusion. Tympanic membrane is not erythematous.      Nose: No congestion or rhinorrhea.      Mouth/Throat:      Mouth: Mucous membranes are moist. No oral lesions.      Pharynx:  Oropharynx is clear. Uvula midline. Posterior oropharyngeal erythema present. No pharyngeal swelling, oropharyngeal exudate or uvula swelling.      Tonsils: No tonsillar exudate or tonsillar abscesses. 0 on the right. 0 on the left.   Eyes:      Extraocular Movements:      Right eye: Normal extraocular motion.      Left eye: Normal extraocular motion.      Conjunctiva/sclera: Conjunctivae normal.      Pupils: Pupils are equal, round, and reactive to light.   Cardiovascular:      Rate and Rhythm: Normal rate and regular rhythm.      Heart sounds: Normal heart sounds. No murmur heard.     No friction rub. No gallop.   Pulmonary:      Effort: Pulmonary effort is normal. No respiratory distress.      Breath sounds: Normal breath sounds. No stridor. No wheezing, rhonchi or rales.   Chest:      Chest wall: No tenderness.   Abdominal:      General: Abdomen is flat. Bowel sounds are normal. There is no distension.      Palpations: Abdomen is soft.      Tenderness: There is no abdominal tenderness. There is no guarding.   Musculoskeletal:         General: Normal range of motion.   Skin:     General: Skin is warm and dry.      Capillary Refill: Capillary refill takes less than 2 seconds.   Neurological:      Mental Status: She is alert.

## 2024-07-07 NOTE — LETTER
July 7, 2024     Patient: Nadia Galeano  YOB: 2005  Date of Visit: 7/7/2024      To Whom it May Concern:    Nadia Galeano is under my professional care. Nadia was seen in my office on 7/7/2024. Nadia may return to school on 7/10/24 .    If you have any questions or concerns, please don't hesitate to call.         Sincerely,          Nayeli Garcia PA-C        CC: No Recipients

## 2024-07-09 LAB — BACTERIA THROAT CULT: NORMAL

## 2024-08-22 ENCOUNTER — TELEPHONE (OUTPATIENT)
Age: 19
End: 2024-08-22

## 2024-08-22 NOTE — TELEPHONE ENCOUNTER
Contacted patient for Talk Therapy  to verify needs of services in attempts to offer patient an appointment at [facility]. Writer verified N/A - NO ANSWER. Writer LUIS MIGUELM and left callback number [phone]; option 3.    1st call attempt

## 2024-08-26 NOTE — TELEPHONE ENCOUNTER
Contacted patient for Talk Therapy  to verify needs of services in attempts to offer patient an appointment at [facility]. Writer verified N/A - NO ANSWER. Writer LVM and left callback number [phone]; option 3.    2nd call attempt, pt removed from wait list

## 2024-08-27 DIAGNOSIS — F32.A DEPRESSION, UNSPECIFIED DEPRESSION TYPE: ICD-10-CM

## 2024-08-28 RX ORDER — DESVENLAFAXINE 100 MG/1
100 TABLET, EXTENDED RELEASE ORAL EVERY MORNING
Qty: 30 TABLET | Refills: 5 | Status: SHIPPED | OUTPATIENT
Start: 2024-08-28

## 2024-11-03 DIAGNOSIS — F34.9 PERSISTENT MOOD (AFFECTIVE) DISORDER, UNSPECIFIED (HCC): ICD-10-CM

## 2024-11-03 DIAGNOSIS — F41.0 PANIC DISORDER: ICD-10-CM

## 2024-11-03 DIAGNOSIS — F41.1 GAD (GENERALIZED ANXIETY DISORDER): ICD-10-CM

## 2024-11-04 RX ORDER — CARIPRAZINE 3 MG/1
3 CAPSULE, GELATIN COATED ORAL DAILY
Qty: 30 CAPSULE | Refills: 0 | Status: SHIPPED | OUTPATIENT
Start: 2024-11-04

## 2024-11-05 ENCOUNTER — TELEPHONE (OUTPATIENT)
Dept: PSYCHIATRY | Facility: CLINIC | Age: 19
End: 2024-11-05

## 2024-11-21 ENCOUNTER — OFFICE VISIT (OUTPATIENT)
Dept: URGENT CARE | Facility: CLINIC | Age: 19
End: 2024-11-21
Payer: COMMERCIAL

## 2024-11-21 VITALS
WEIGHT: 139 LBS | TEMPERATURE: 96.9 F | OXYGEN SATURATION: 100 % | BODY MASS INDEX: 21.77 KG/M2 | HEART RATE: 100 BPM | RESPIRATION RATE: 14 BRPM

## 2024-11-21 DIAGNOSIS — B34.9 VIRAL SYNDROME: Primary | ICD-10-CM

## 2024-11-21 PROCEDURE — 99213 OFFICE O/P EST LOW 20 MIN: CPT | Performed by: PHYSICIAN ASSISTANT

## 2024-11-21 PROCEDURE — 87636 SARSCOV2 & INF A&B AMP PRB: CPT | Performed by: PHYSICIAN ASSISTANT

## 2024-11-21 RX ORDER — BENZONATATE 100 MG/1
100 CAPSULE ORAL 3 TIMES DAILY PRN
Qty: 20 CAPSULE | Refills: 0 | Status: SHIPPED | OUTPATIENT
Start: 2024-11-21

## 2024-11-21 NOTE — LETTER
November 21, 2024     Patient: Nadia Galeano  YOB: 2005  Date of Visit: 11/21/2024      To Whom it May Concern:    Nadia Galeano is under my professional care. Nadia was seen in my office on 11/21/2024. Nadia may return to work on 11/25/24 .    If you have any questions or concerns, please don't hesitate to call.         Sincerely,          Nayeli Garcia PA-C        CC: No Recipients

## 2024-11-21 NOTE — PATIENT INSTRUCTIONS
"Patient Education     Cough, runny nose, and the common cold   The Basics   Written by the doctors and editors at [facility]   What causes cough, runny nose, and other symptoms of the common cold? -- These symptoms are usually caused by a virus. Doctors also use the term \"viral upper respiratory infection\" or \"viral URI.\" Lots of different viruses can get into your nose, mouth, throat, or airways and cause cold symptoms.  Most people get better from a cold without any lasting problems. Even so, having a cold can be uncomfortable.  What are the symptoms of the common cold? -- Symptoms can include:   Sneezing   Coughing   Sniffling and runny nose   Sore throat   Chest congestion  In children, the common cold can also cause a fever. But adults do not usually get a fever when they have a cold.  Colds usually last about 3 to 7 days in adults and 10 days in children. But some people have symptoms for up to 2 weeks.  How can I tell if I have a cold or something else? -- Sometimes, it can be hard to tell if you have a cold or something else. Some cold symptoms can also be caused by other illnesses, such as COVID-19, the flu, or strep throat.  There are sometimes clues that can help you tell the difference:   COVID-19 often starts out very similar to a cold, although it can also cause a fever. If you have cold symptoms and have been around someone with COVID-19, you should get a test to find out if you have it, too.   The flu is more likely to cause fever, body aches, and extreme tiredness than a cold.   Strep throat usually causes severe throat pain. It can also cause a fever and swollen glands in the neck. People with strep throat usually do not have other cold symptoms like a stuffy nose or cough.  If you think that you might have an illness other than the common cold, call your doctor or nurse. They can tell you what to do.  Can medicine help with a cold? -- Usually, a cold gets better on its own and does not need " treatment. Because colds are usually caused by viruses, antibiotics will not help.  If you are a teen or an adult, you can try cough and cold medicines that you can get without a prescription. These medicines might help with your symptoms. But they can't cure your cold, or help you get well faster.  If you decide to try non-prescription cold medicines:   Read the directions on the label, and follow them carefully.   Do not combine 2 or more medicines that have acetaminophen in them. If you take too much acetaminophen, it can damage your liver.   If you have a heart condition, have high blood pressure, or take any prescription medicines, talk to your doctor or pharmacist before taking cold medicine. They can tell you which medicines are safe.  Some medicines are not safe for children:   If your child is younger than 6, do not give them any cold medicines. These medicines are not safe for young children. Even if your child is older than 6, cough and cold medicines are unlikely to help.   Never give aspirin to any child younger than 18 years old. In children, aspirin can cause a life-threatening condition called Reye syndrome.   When giving your child acetaminophen or other non-prescription medicines, never give more than the recommended dose.  Is there anything I can do on my own to feel better? -- Yes. You can:   Get plenty of rest.   Drink lots of fluids (water, juice, or broth) to stay hydrated. This will help replace any fluids lost if you have a runny nose or sweating from a fever. Warm tea or soup can help soothe a sore throat.   If the air in your home feels dry, use a cool-mist humidifier. This can help a stuffy nose and make it easier to breathe.   Use saline nose drops or spray to relieve stuffiness.   Avoid smoking, and stay away from places where people are smoking.  Can the common cold lead to more serious problems? -- In some cases, yes. In some people, having a cold can lead to:   Ear infections   Worse  asthma symptoms   Sinus infections   Pneumonia or bronchitis (infections of the lungs)  Can colds be prevented? -- There are some things you can do to keep germs from spreading:   Wash your hands with soap and water often (figure 1) - This can also help prevent the spread of other illnesses like the flu and COVID-19.   Cover your cough - Cough into your elbow instead of your hands. Teach children to do this, too. Throw away used tissues right away.   Clean surfaces - The germs that cause the common cold can live on tables, door handles, and other surfaces for at least 2 hours.   Stay home if you are sick - When you do need to be around other people, consider wearing a face mask until you are feeling better.  When should I call the doctor? -- Contact your doctor or nurse if you:   Lose your sense of taste or smell   Have a fever of more than 100.4°F (38°C) that comes with shaking chills, loss of appetite, or trouble breathing   Have a very bad sore throat   Have a fever and also have lung disease, such as emphysema or asthma   Have a cough that lasts longer than 10 days or starts getting worse   Have chest pain when you cough or breathe deeply, have trouble breathing, or cough up blood  If you are older than 65, or if you have any chronic medical conditions such as diabetes, contact your doctor or nurse any time you get a long-lasting cough.  Take your child to the emergency department if they:   Become confused or stop responding to you   Have trouble breathing or have to work hard to breathe  Contact your child's doctor or nurse if the child:   Loses their sense of taste or smell or won't eat foods that they ate before   Has a very bad sore throat   Refuses to drink anything for a long time   Is younger than 4 months   Has a fever and is not acting like themselves   Has a cough that lasts for more than 2 weeks and is not getting any better or is getting worse   Has a stuffed or runny nose that gets worse or does  not get any better after 10 days   Has red eyes or yellow goop coming out of their eyes   Has ear pain, pulls at their ears, or shows other signs of having an ear infection  All topics are updated as new evidence becomes available and our peer review process is complete.  This topic retrieved from [employer] on: Feb 26, 2024.  Topic 73883 Version 30.0  Release: 32.2.4 - C32.56  © 2024 UpToDate, Inc. and/or its affiliates. All rights reserved.  figure 1: How to wash your hands     Wet your hands with clean water, and apply a small amount of soap. Lather and rub hands together for at least 20 seconds. Clean your wrists, palms, backs of your hands, between your fingers, tips of your fingers, thumbs, and under and around your nails. Rinse well, and dry your hands using a clean towel.  Graphic 929034 Version 7.0  Consumer Information Use and Disclaimer   Disclaimer: This generalized information is a limited summary of diagnosis, treatment, and/or medication information. It is not meant to be comprehensive and should be used as a tool to help the user understand and/or assess potential diagnostic and treatment options. It does NOT include all information about conditions, treatments, medications, side effects, or risks that may apply to a specific patient. It is not intended to be medical advice or a substitute for the medical advice, diagnosis, or treatment of a health care provider based on the health care provider's examination and assessment of a patient's specific and unique circumstances. Patients must speak with a health care provider for complete information about their health, medical questions, and treatment options, including any risks or benefits regarding use of medications. This information does not endorse any treatments or medications as safe, effective, or approved for treating a specific patient. UpToDate, Inc. and its affiliates disclaim any warranty or liability relating to this information or the use  thereof.The use of this information is governed by the Terms of Use, available at https://www.wolterskluwer.com/en/know/clinical-effectiveness-terms. 2024© UpToDate, Inc. and its affiliates and/or licensors. All rights reserved.  Copyright   © 2024 [employer] and/or its affiliates. All rights reserved.

## 2024-11-21 NOTE — PROGRESS NOTES
"  [facility]        NAME: Nadia Galeano is a 19 y.o. female  : 2005    MRN: [id number]  DATE: 2024  TIME: 12:58 PM    Assessment and Plan   Viral syndrome [B34.9]  1. Viral syndrome  Covid/Flu- Office Collect Normal    benzonatate (TESSALON PERLES) 100 mg capsule    Covid/Flu- Office Collect Normal            Patient Instructions     Patient Instructions   Patient Education     Cough, runny nose, and the common cold   The Basics   Written by the doctors and editors at [facility]   What causes cough, runny nose, and other symptoms of the common cold? -- These symptoms are usually caused by a virus. Doctors also use the term \"viral upper respiratory infection\" or \"viral URI.\" Lots of different viruses can get into your nose, mouth, throat, or airways and cause cold symptoms.  Most people get better from a cold without any lasting problems. Even so, having a cold can be uncomfortable.  What are the symptoms of the common cold? -- Symptoms can include:   Sneezing   Coughing   Sniffling and runny nose   Sore throat   Chest congestion  In children, the common cold can also cause a fever. But adults do not usually get a fever when they have a cold.  Colds usually last about 3 to 7 days in adults and 10 days in children. But some people have symptoms for up to 2 weeks.  How can I tell if I have a cold or something else? -- Sometimes, it can be hard to tell if you have a cold or something else. Some cold symptoms can also be caused by other illnesses, such as COVID-19, the flu, or strep throat.  There are sometimes clues that can help you tell the difference:   COVID-19 often starts out very similar to a cold, although it can also cause a fever. If you have cold symptoms and have been around someone with COVID-19, you should get a test to find out if you have it, too.   The flu is more likely to cause fever, body aches, and extreme tiredness than a cold.   Strep throat usually causes severe " throat pain. It can also cause a fever and swollen glands in the neck. People with strep throat usually do not have other cold symptoms like a stuffy nose or cough.  If you think that you might have an illness other than the common cold, call your doctor or nurse. They can tell you what to do.  Can medicine help with a cold? -- Usually, a cold gets better on its own and does not need treatment. Because colds are usually caused by viruses, antibiotics will not help.  If you are a teen or an adult, you can try cough and cold medicines that you can get without a prescription. These medicines might help with your symptoms. But they can't cure your cold, or help you get well faster.  If you decide to try non-prescription cold medicines:   Read the directions on the label, and follow them carefully.   Do not combine 2 or more medicines that have acetaminophen in them. If you take too much acetaminophen, it can damage your liver.   If you have a heart condition, have high blood pressure, or take any prescription medicines, talk to your doctor or pharmacist before taking cold medicine. They can tell you which medicines are safe.  Some medicines are not safe for children:   If your child is younger than 6, do not give them any cold medicines. These medicines are not safe for young children. Even if your child is older than 6, cough and cold medicines are unlikely to help.   Never give aspirin to any child younger than 18 years old. In children, aspirin can cause a life-threatening condition called Reye syndrome.   When giving your child acetaminophen or other non-prescription medicines, never give more than the recommended dose.  Is there anything I can do on my own to feel better? -- Yes. You can:   Get plenty of rest.   Drink lots of fluids (water, juice, or broth) to stay hydrated. This will help replace any fluids lost if you have a runny nose or sweating from a fever. Warm tea or soup can help soothe a sore throat.   If  the air in your home feels dry, use a cool-mist humidifier. This can help a stuffy nose and make it easier to breathe.   Use saline nose drops or spray to relieve stuffiness.   Avoid smoking, and stay away from places where people are smoking.  Can the common cold lead to more serious problems? -- In some cases, yes. In some people, having a cold can lead to:   Ear infections   Worse asthma symptoms   Sinus infections   Pneumonia or bronchitis (infections of the lungs)  Can colds be prevented? -- There are some things you can do to keep germs from spreading:   Wash your hands with soap and water often (figure 1) - This can also help prevent the spread of other illnesses like the flu and COVID-19.   Cover your cough - Cough into your elbow instead of your hands. Teach children to do this, too. Throw away used tissues right away.   Clean surfaces - The germs that cause the common cold can live on tables, door handles, and other surfaces for at least 2 hours.   Stay home if you are sick - When you do need to be around other people, consider wearing a face mask until you are feeling better.  When should I call the doctor? -- Contact your doctor or nurse if you:   Lose your sense of taste or smell   Have a fever of more than 100.4°F (38°C) that comes with shaking chills, loss of appetite, or trouble breathing   Have a very bad sore throat   Have a fever and also have lung disease, such as emphysema or asthma   Have a cough that lasts longer than 10 days or starts getting worse   Have chest pain when you cough or breathe deeply, have trouble breathing, or cough up blood  If you are older than 65, or if you have any chronic medical conditions such as diabetes, contact your doctor or nurse any time you get a long-lasting cough.  Take your child to the emergency department if they:   Become confused or stop responding to you   Have trouble breathing or have to work hard to breathe  Contact your child's doctor or nurse if the  child:   Loses their sense of taste or smell or won't eat foods that they ate before   Has a very bad sore throat   Refuses to drink anything for a long time   Is younger than 4 months   Has a fever and is not acting like themselves   Has a cough that lasts for more than 2 weeks and is not getting any better or is getting worse   Has a stuffed or runny nose that gets worse or does not get any better after 10 days   Has red eyes or yellow goop coming out of their eyes   Has ear pain, pulls at their ears, or shows other signs of having an ear infection  All topics are updated as new evidence becomes available and our peer review process is complete.  This topic retrieved from [employer] on: Feb 26, 2024.  Topic 08269 Version 30.0  Release: 32.2.4 - C32.56  © 2024 UpToDate, Inc. and/or its affiliates. All rights reserved.  figure 1: How to wash your hands     Wet your hands with clean water, and apply a small amount of soap. Lather and rub hands together for at least 20 seconds. Clean your wrists, palms, backs of your hands, between your fingers, tips of your fingers, thumbs, and under and around your nails. Rinse well, and dry your hands using a clean towel.  Graphic 273014 Version 7.0  Consumer Information Use and Disclaimer   Disclaimer: This generalized information is a limited summary of diagnosis, treatment, and/or medication information. It is not meant to be comprehensive and should be used as a tool to help the user understand and/or assess potential diagnostic and treatment options. It does NOT include all information about conditions, treatments, medications, side effects, or risks that may apply to a specific patient. It is not intended to be medical advice or a substitute for the medical advice, diagnosis, or treatment of a health care provider based on the health care provider's examination and assessment of a patient's specific and unique circumstances. Patients must speak with a health care provider for  complete information about their health, medical questions, and treatment options, including any risks or benefits regarding use of medications. This information does not endorse any treatments or medications as safe, effective, or approved for treating a specific patient. UpToDate, Inc. and its affiliates disclaim any warranty or liability relating to this information or the use thereof.The use of this information is governed by the Terms of Use, available at https://www.Ensequence.com/en/know/clinical-effectiveness-terms. 2024© UpToDate, Inc. and its affiliates and/or licensors. All rights reserved.  Copyright   © 2024 UpToDate, Inc. and/or its affiliates. All rights reserved.        Follow up with PCP in 3-5 days.  Proceed to  ER if symptoms worsen.    Chief Complaint     Chief Complaint   Patient presents with    Cold Like Symptoms     Pt presents with cough, head/chest congestion, sore throat, started two days ago         History of Present Illness       The patient is a 19-year-old female presenting today for cough, head and chest congestion and sore throat that started 2 days ago.  Has not tried anything over-the-counter.  Denies fevers, chills, headache, nausea, vomiting or diarrhea.  Notes that she works in a  that everyone has been sick.  Reports not working weekends and has work on Monday.        Review of Systems   Review of Systems   Constitutional:  Positive for fatigue. Negative for activity change, appetite change, chills and fever.   HENT:  Positive for congestion, rhinorrhea and sore throat. Negative for ear pain, sinus pressure and sinus pain.    Eyes:  Negative for pain and visual disturbance.   Respiratory:  Positive for cough. Negative for chest tightness and shortness of breath.    Cardiovascular:  Negative for chest pain and palpitations.   Gastrointestinal:  Negative for abdominal pain, diarrhea, nausea and vomiting.   Genitourinary:  Negative for dysuria and hematuria.    Musculoskeletal:  Negative for arthralgias, back pain and myalgias.   Skin:  Negative for color change, pallor and rash.   Neurological:  Negative for seizures, syncope and headaches.   All other systems reviewed and are negative.        Current Medications       Current Outpatient Medications:     benzonatate (TESSALON PERLES) 100 mg capsule, Take 1 capsule (100 mg total) by mouth 3 (three) times a day as needed for cough, Disp: 20 capsule, Rfl: 0    cariprazine (Vraylar) 3 MG capsule, TAKE ONE CAPSULE BY MOUTH EVERY DAY, Disp: 30 capsule, Rfl: 0    desvenlafaxine (PRISTIQ) 100 mg 24 hr tablet, TAKE 1 TABLET BY MOUTH IN THE  MORNING, Disp: 30 tablet, Rfl: 5    benzonatate (TESSALON PERLES) 100 mg capsule, Take 1 capsule (100 mg total) by mouth 3 (three) times a day as needed for cough (Patient not taking: Reported on 11/21/2024), Disp: 30 capsule, Rfl: 0    Current Allergies     Allergies as of 11/21/2024    (No Known Allergies)            The following portions of the patient's history were reviewed and updated as appropriate: allergies, current medications, past family history, past medical history, past social history, past surgical history and problem list.     Past Medical History:   Diagnosis Date    Anxiety     Depression     Eating disorder     Panic attack     Urinary tract infection        Past Surgical History:   Procedure Laterality Date    NO PAST SURGERIES         Family History   Problem Relation Age of Onset    Anxiety disorder Mother     Leukemia Father     Diverticulosis Father     Anxiety disorder Maternal Grandmother     Depression Maternal Grandmother     Bipolar disorder Maternal Aunt          Medications have been verified.        Objective   Pulse 100   Temp (!) 96.9 °F (36.1 °C)   Resp 14   Wt 63 kg (139 lb)   LMP 11/05/2024 (Exact Date)   SpO2 100%   BMI 21.77 kg/m²        Physical Exam     Physical Exam  Vitals and nursing note reviewed.   Constitutional:       General: She is not  in acute distress.     Appearance: Normal appearance. She is well-developed and normal weight. She is not ill-appearing, toxic-appearing or diaphoretic.   HENT:      Head: Normocephalic and atraumatic.      Right Ear: No drainage, swelling or tenderness. No middle ear effusion. There is impacted cerumen. Tympanic membrane is not erythematous.      Left Ear: Tympanic membrane, ear canal and external ear normal. No drainage, swelling or tenderness.  No middle ear effusion. There is no impacted cerumen. Tympanic membrane is not erythematous.      Nose: Nose normal. No congestion or rhinorrhea.      Mouth/Throat:      Mouth: Mucous membranes are moist. No oral lesions.      Pharynx: Oropharynx is clear. Uvula midline. No pharyngeal swelling, oropharyngeal exudate, posterior oropharyngeal erythema or uvula swelling.      Tonsils: No tonsillar exudate or tonsillar abscesses. 0 on the right. 0 on the left.   Eyes:      Extraocular Movements: Extraocular movements intact.      Right eye: Normal extraocular motion.      Left eye: Normal extraocular motion.      Conjunctiva/sclera: Conjunctivae normal.      Pupils: Pupils are equal, round, and reactive to light.   Cardiovascular:      Rate and Rhythm: Normal rate and regular rhythm.      Heart sounds: Normal heart sounds. No murmur heard.     No friction rub. No gallop.   Pulmonary:      Effort: Pulmonary effort is normal. No respiratory distress.      Breath sounds: Normal breath sounds. No stridor. No wheezing, rhonchi or rales.   Chest:      Chest wall: No tenderness.   Skin:     General: Skin is warm and dry.      Capillary Refill: Capillary refill takes less than 2 seconds.   Neurological:      Mental Status: She is alert.

## 2024-12-12 DIAGNOSIS — F32.A DEPRESSION, UNSPECIFIED DEPRESSION TYPE: ICD-10-CM

## 2024-12-13 NOTE — TELEPHONE ENCOUNTER
Has refills on file with the pharmacy. Forwarding to primary provider for review upon return. Request generated through [employer].

## 2024-12-15 RX ORDER — DESVENLAFAXINE 100 MG/1
100 TABLET, EXTENDED RELEASE ORAL EVERY MORNING
Qty: 30 TABLET | Refills: 0 | Status: SHIPPED | OUTPATIENT
Start: 2024-12-15

## 2024-12-16 ENCOUNTER — TELEPHONE (OUTPATIENT)
Dept: PSYCHIATRY | Facility: CLINIC | Age: 19
End: 2024-12-16

## 2024-12-16 NOTE — TELEPHONE ENCOUNTER
----- Message from Aracelis Stoll, PhD sent at 12/15/2024  9:53 AM EST -----  Please call patient and schedule an appointment. Thank you

## 2025-01-08 ENCOUNTER — TELEPHONE (OUTPATIENT)
Age: 20
End: 2025-01-08

## 2025-01-08 NOTE — TELEPHONE ENCOUNTER
Patients mother called in to schedule an appt for the patient with Dr Stoll. Writer checked the chart and informed her there was no communication consent on file,. Due to the patients age she would need to call in herself or give obe time verbal for this call. Patients mother called her and writer was able to obtain verbal permission.           Patient is scheduled for 1/21/2025 at 6:30 pm virtually.

## 2025-01-10 ENCOUNTER — OFFICE VISIT (OUTPATIENT)
Dept: URGENT CARE | Facility: CLINIC | Age: 20
End: 2025-01-10
Payer: COMMERCIAL

## 2025-01-10 VITALS
SYSTOLIC BLOOD PRESSURE: 110 MMHG | TEMPERATURE: 100 F | RESPIRATION RATE: 16 BRPM | BODY MASS INDEX: 21.14 KG/M2 | OXYGEN SATURATION: 99 % | HEART RATE: 110 BPM | WEIGHT: 135 LBS | DIASTOLIC BLOOD PRESSURE: 68 MMHG

## 2025-01-10 DIAGNOSIS — J02.0 STREP PHARYNGITIS: Primary | ICD-10-CM

## 2025-01-10 LAB — S PYO AG THROAT QL: POSITIVE

## 2025-01-10 PROCEDURE — 87880 STREP A ASSAY W/OPTIC: CPT

## 2025-01-10 PROCEDURE — 99213 OFFICE O/P EST LOW 20 MIN: CPT

## 2025-01-10 RX ORDER — AMOXICILLIN 500 MG/1
500 CAPSULE ORAL EVERY 12 HOURS SCHEDULED
Qty: 20 CAPSULE | Refills: 0 | Status: SHIPPED | OUTPATIENT
Start: 2025-01-10 | End: 2025-01-20

## 2025-01-10 NOTE — PROGRESS NOTES
[facility]        NAME: Nadia Galeano is a 19 y.o. female  : 2005    MRN: [id number]  DATE: January 10, 2025  TIME: 5:59 PM    Assessment and Plan   Strep pharyngitis [J02.0]  1. Strep pharyngitis  POCT rapid ANTIGEN strepA    amoxicillin (AMOXIL) 500 mg capsule        Rapid strep positive in the office.     Patient Instructions     Take antibiotics as prescribed  Replace toothbrush after 2-3 days of treatment  Fluids and rest  Salt water gargles and chloraseptic spray  Warm tea with honey  Wash hands frequently  Don't share drinks  Tylenol/Ibuprofen for pain/fever    Follow up with PCP in 3-5 days.  Proceed to the ER with worsening symptoms.       If tests are performed, our office will contact you with results only if changes need to made to the care plan discussed with you at the visit. [facility]    Chief Complaint     Chief Complaint   Patient presents with    Sore Throat     Pt c/o sore throat that started yesterday         History of Present Illness       Sore Throat   This is a new problem. The current episode started yesterday. The problem has been gradually worsening. The pain is worse on the left side. There has been no fever. The pain is moderate. Associated symptoms include a hoarse voice and swollen glands. Pertinent negatives include no abdominal pain, congestion, coughing, headaches, shortness of breath, trouble swallowing or vomiting. She has tried nothing for the symptoms.       Review of Systems   Review of Systems   Constitutional:  Negative for chills and fever.   HENT:  Positive for hoarse voice, sore throat and voice change. Negative for congestion, postnasal drip, rhinorrhea, sinus pressure and trouble swallowing.    Respiratory:  Negative for cough, chest tightness and shortness of breath.    Cardiovascular:  Negative for chest pain and palpitations.   Gastrointestinal:  Negative for abdominal pain, nausea and vomiting.    Genitourinary:  Negative for difficulty urinating.   Musculoskeletal:  Negative for myalgias.   Neurological:  Negative for dizziness and headaches.         Current Medications       Current Outpatient Medications:     amoxicillin (AMOXIL) 500 mg capsule, Take 1 capsule (500 mg total) by mouth every 12 (twelve) hours for 10 days, Disp: 20 capsule, Rfl: 0    cariprazine (Vraylar) 3 MG capsule, TAKE ONE CAPSULE BY MOUTH EVERY DAY, Disp: 30 capsule, Rfl: 0    desvenlafaxine (PRISTIQ) 100 mg 24 hr tablet, TAKE 1 TABLET BY MOUTH IN THE  MORNING, Disp: 30 tablet, Rfl: 0    benzonatate (TESSALON PERLES) 100 mg capsule, Take 1 capsule (100 mg total) by mouth 3 (three) times a day as needed for cough (Patient not taking: Reported on 11/21/2024), Disp: 30 capsule, Rfl: 0    benzonatate (TESSALON PERLES) 100 mg capsule, Take 1 capsule (100 mg total) by mouth 3 (three) times a day as needed for cough, Disp: 20 capsule, Rfl: 0    Current Allergies     Allergies as of 01/10/2025    (No Known Allergies)            The following portions of the patient's history were reviewed and updated as appropriate: allergies, current medications, past family history, past medical history, past social history, past surgical history and problem list.     Past Medical History:   Diagnosis Date    Anxiety     Depression     Eating disorder     Panic attack     Urinary tract infection        Past Surgical History:   Procedure Laterality Date    NO PAST SURGERIES         Family History   Problem Relation Age of Onset    Anxiety disorder Mother     Leukemia Father     Diverticulosis Father     Anxiety disorder Maternal Grandmother     Depression Maternal Grandmother     Bipolar disorder Maternal Aunt          Medications have been verified.        Objective   /68   Pulse (!) 110   Temp 100 °F (37.8 °C)   Resp 16   Wt 61.2 kg (135 lb)   SpO2 99%   BMI 21.14 kg/m²        Physical Exam     Physical Exam  Constitutional:       General: She  is not in acute distress.     Appearance: She is not ill-appearing.   HENT:      Nose: Nose normal.      Mouth/Throat:      Mouth: Mucous membranes are moist.      Pharynx: Oropharynx is clear. Posterior oropharyngeal erythema present.      Tonsils: Tonsillar exudate present. No tonsillar abscesses. 3+ on the right. 3+ on the left.      Comments: Hoarse voice  Eyes:      Pupils: Pupils are equal, round, and reactive to light.   Cardiovascular:      Rate and Rhythm: Normal rate and regular rhythm.      Pulses: Normal pulses.      Heart sounds: Normal heart sounds. No murmur heard.     No gallop.   Pulmonary:      Effort: Pulmonary effort is normal. No respiratory distress.      Breath sounds: Normal breath sounds. No wheezing.   Abdominal:      General: Abdomen is flat. Bowel sounds are normal. There is no distension.      Palpations: Abdomen is soft.      Tenderness: There is no abdominal tenderness.   Musculoskeletal:         General: Normal range of motion.      Cervical back: Normal range of motion.   Lymphadenopathy:      Cervical: Cervical adenopathy present.   Skin:     General: Skin is warm and dry.      Capillary Refill: Capillary refill takes less than 2 seconds.   Neurological:      Mental Status: She is alert and oriented to person, place, and time.

## 2025-01-10 NOTE — LETTER
January 10, 2025     Patient: Nadia Galeano   YOB: 2005   Date of Visit: 1/10/2025       To Whom it May Concern:    Nadia Galeano was seen in my clinic on 1/10/2025. She may return to work on 1/13/2025 .    If you have any questions or concerns, please don't hesitate to call.         Sincerely,          aSfia Hirsch PA-C        CC: No Recipients

## 2025-01-13 ENCOUNTER — RA CDI HCC (OUTPATIENT)
Dept: OTHER | Facility: HOSPITAL | Age: 20
End: 2025-01-13

## 2025-01-13 ENCOUNTER — OFFICE VISIT (OUTPATIENT)
Dept: URGENT CARE | Facility: CLINIC | Age: 20
End: 2025-01-13
Payer: COMMERCIAL

## 2025-01-13 VITALS
HEIGHT: 67 IN | RESPIRATION RATE: 18 BRPM | HEART RATE: 103 BPM | TEMPERATURE: 98 F | WEIGHT: 135 LBS | OXYGEN SATURATION: 99 % | BODY MASS INDEX: 21.19 KG/M2

## 2025-01-13 DIAGNOSIS — J02.0 STREP THROAT: Primary | ICD-10-CM

## 2025-01-13 PROCEDURE — 99203 OFFICE O/P NEW LOW 30 MIN: CPT | Performed by: PHYSICIAN ASSISTANT

## 2025-01-13 RX ORDER — METHYLPREDNISOLONE 4 MG/1
TABLET ORAL
Qty: 21 TABLET | Refills: 0 | Status: SHIPPED | OUTPATIENT
Start: 2025-01-13 | End: 2025-01-20

## 2025-01-13 NOTE — PATIENT INSTRUCTIONS
Continue to monitor symptoms.  If new or worsening symptoms develop, go immediately to Er. Drink plenty of fluids.  Follow up with Family Doctor this week.    If tests are performed, our office will contact you with results only if changes need to made to the care plan discussed with you at the visit. You can review your full results on [facility].     Patient Education     Strep Throat ED   General Information   You came to the Emergency Department (ED) for a sore throat. The staff did tests and found that you have strep throat, which is an infection caused by bacteria. Most of the time, you will need antibiotics to treat strep throat. The antibiotics can help prevent other problems that strep throat can sometimes cause. Often, you will feel better in a few days but will need to finish all  of your antibiotics.  What care is needed at home?   Call your regular doctor to let them know you were in the ED. Make a follow-up appointment if you were told to.  Try different liquids to be sure you take in enough fluids.  You may want to take drugs like ibuprofen or acetaminophen for pain.  You can also try these things to soothe throat pain:  Suck on ice chips or a freezer pop.  Sip warm tea or soup.  Older children or adults may want to gargle with warm saltwater a few times each day.  Older children or adults may want to suck on hard candy or a lollipop.  Wash your hands often. This will help keep others healthy.  Stay at home for 24 hours after starting antibiotics. Sick children should stay at home until the doctor says it is OK for them to return to school or . This will help prevent the infection from spreading to other people.  When do I need to get emergency help?   Call for an ambulance right away if:   You have trouble breathing or swallowing.  Your neck, tongue, or throat is swollen and is making it hard to breathe.  Return to the ED if:   You are drooling because you cannot swallow your  saliva.  You have very bad pain in your throat and cannot eat or drink anything.  You can't open your mouth all the way.  You have a stiff neck.  You have signs of severe fluid loss, such as:  No urine for more than 8 hours.  You feel very lightheaded or like you are going to pass out.  You feel weak like you are going to fall.  You are not able to keep any fluids down.  When do I need to call the doctor?   There are large, painful lumps in your neck.  You have symptoms 2 or more weeks after your strep throat like:  Joint pain or swelling.  Rash.  Blood in your urine or you are urinating less than normal.  Swelling of your face, hands, feet, ankles, or abdomen.  You develop early signs of fluid loss, such as:  Dark-colored urine.  Dry mouth.  Muscle cramps.  Lack of energy.  Feeling light-headed when you get up.  You have new or worsening symptoms.  Last Reviewed Date   2021-05-07  Consumer Information Use and Disclaimer   This generalized information is a limited summary of diagnosis, treatment, and/or medication information. It is not meant to be comprehensive and should be used as a tool to help the user understand and/or assess potential diagnostic and treatment options. It does NOT include all information about conditions, treatments, medications, side effects, or risks that may apply to a specific patient. It is not intended to be medical advice or a substitute for the medical advice, diagnosis, or treatment of a health care provider based on the health care provider's examination and assessment of a patient’s specific and unique circumstances. Patients must speak with a health care provider for complete information about their health, medical questions, and treatment options, including any risks or benefits regarding use of medications. This information does not endorse any treatments or medications as safe, effective, or approved for treating a specific patient. [employer] and its affiliates disclaim any  warranty or liability relating to this information or the use thereof. The use of this information is governed by the Terms of Use, available at https://www.wolterskluwer.com/en/know/clinical-effectiveness-terms   Copyright   Copyright © 2024 [employer] and its affiliates and/or licensors. All rights reserved.

## 2025-01-13 NOTE — PROGRESS NOTES
[facility]        NAME: Nadia Galeano is a 19 y.o. female  : 2005    MRN: [id number]  DATE: 2025  TIME: 6:57 PM    Assessment and Plan   Strep throat [J02.0]  1. Strep throat  methylPREDNISolone 4 MG tablet therapy pack    Mononucleosis screen        Patient appears well.  Tonsils are not touching, swelling is symmetrical no uvular deviation.  Per mom, throat is responding to amoxicillin, however patient still is having pain.  Patient is tolerating p.o. foods and liquids however states that hurts to swallow.  Well-hydrated.  Fevers are gone.  At this time we will start on a steroid, continue amoxicillin for next 7 days as prescribed.  I educated patient and mom on signs and symptoms of worsening condition and indications to go immediately to the ER.  They state they understand and agree to plan.    Patient Instructions     Continue to monitor symptoms.  If new or worsening symptoms develop, go immediately to Er. Drink plenty of fluids.  Follow up with Family Doctor this week.    If tests are performed, our office will contact you with results only if changes need to made to the care plan discussed with you at the visit. [facility]     Chief Complaint     Chief Complaint   Patient presents with    Sore Throat     Sore throat being treated but still is painful needs  Note   X 2 days                   History of Present Illness       Sore Throat   This is a new problem. Episode onset: Patient was seen 1/10/2025 for same.  She was positive for strep at that time.  She was started on amoxicillin at that time and she is on her third day of medications.  Mom states that redness and swelling have decreased, patient states pain not lessened. Progression since onset: Slightly improved. Neither side of throat is experiencing more pain than the other. There has been no fever (Patient initially presented with fevers but states that that has resolved  with medications). The pain is moderate. Pertinent negatives include no congestion, coughing, diarrhea, ear pain, headaches, neck pain, shortness of breath or vomiting. Treatments tried: Amoxicillin.   Patient reports no shortness of breath.  Patient reports no trouble swallowing, patient is tolerating foods and liquids however has decreased appetite and pain with swallowing.    Review of Systems   Review of Systems   Constitutional:  Negative for chills, diaphoresis, fatigue and fever.   HENT:  Positive for sore throat. Negative for congestion, ear pain, postnasal drip, rhinorrhea, sinus pressure, sinus pain, sneezing and voice change.    Eyes: Negative.    Respiratory:  Negative for cough, chest tightness, shortness of breath and wheezing.    Cardiovascular:  Negative for chest pain and palpitations.   Gastrointestinal:  Negative for constipation, diarrhea, nausea and vomiting.   Endocrine: Negative.    Genitourinary:  Negative for dysuria.   Musculoskeletal:  Negative for back pain, myalgias and neck pain.   Skin:  Negative for pallor and rash.   Allergic/Immunologic: Negative.    Neurological:  Negative for dizziness, syncope and headaches.   Hematological: Negative.    Psychiatric/Behavioral: Negative.           Current Medications       Current Outpatient Medications:     amoxicillin (AMOXIL) 500 mg capsule, Take 1 capsule (500 mg total) by mouth every 12 (twelve) hours for 10 days, Disp: 20 capsule, Rfl: 0    cariprazine (Vraylar) 3 MG capsule, TAKE ONE CAPSULE BY MOUTH EVERY DAY, Disp: 30 capsule, Rfl: 0    desvenlafaxine (PRISTIQ) 100 mg 24 hr tablet, TAKE 1 TABLET BY MOUTH IN THE  MORNING, Disp: 30 tablet, Rfl: 0    methylPREDNISolone 4 MG tablet therapy pack, Use as directed on package, Disp: 21 tablet, Rfl: 0    benzonatate (TESSALON PERLES) 100 mg capsule, Take 1 capsule (100 mg total) by mouth 3 (three) times a day as needed for cough (Patient not taking: Reported on 11/21/2024), Disp: 30 capsule, Rfl:  "0    benzonatate (TESSALON PERLES) 100 mg capsule, Take 1 capsule (100 mg total) by mouth 3 (three) times a day as needed for cough (Patient not taking: Reported on 1/13/2025), Disp: 20 capsule, Rfl: 0    Current Allergies     Allergies as of 01/13/2025    (No Known Allergies)            The following portions of the patient's history were reviewed and updated as appropriate: allergies, current medications, past family history, past medical history, past social history, past surgical history and problem list.     Past Medical History:   Diagnosis Date    Anxiety     Depression     Eating disorder     Panic attack     Urinary tract infection        Past Surgical History:   Procedure Laterality Date    NO PAST SURGERIES         Family History   Problem Relation Age of Onset    Anxiety disorder Mother     Leukemia Father     Diverticulosis Father     Anxiety disorder Maternal Grandmother     Depression Maternal Grandmother     Bipolar disorder Maternal Aunt          Medications have been verified.        Objective   Pulse 103   Temp 98 °F (36.7 °C)   Resp 18   Ht 5' 7\" (1.702 m)   Wt 61.2 kg (135 lb)   SpO2 99%   BMI 21.14 kg/m²        Physical Exam     Physical Exam  Vitals and nursing note reviewed.   Constitutional:       General: She is not in acute distress.     Appearance: She is well-developed. She is not ill-appearing or diaphoretic.   HENT:      Head: Normocephalic and atraumatic.      Right Ear: Tympanic membrane, ear canal and external ear normal. No drainage, swelling or tenderness. No middle ear effusion. Tympanic membrane is not erythematous.      Left Ear: Tympanic membrane, ear canal and external ear normal. No drainage, swelling or tenderness.  No middle ear effusion. Tympanic membrane is not erythematous.      Nose: No congestion.      Mouth/Throat:      Pharynx: Pharyngeal swelling, oropharyngeal exudate and posterior oropharyngeal erythema present.      Comments: Patient has bilateral erythema " and exudates of tonsils.  Swelling is symmetrical bilaterally.  Tonsils are not kissing in the middle.  Patient is handling own secretions.  I did not see patient at initial visit, however, mother states that the redness has decreased.  Eyes:      General:         Right eye: No discharge.         Left eye: No discharge.      Conjunctiva/sclera: Conjunctivae normal.   Cardiovascular:      Rate and Rhythm: Normal rate and regular rhythm.      Heart sounds: Normal heart sounds.   Pulmonary:      Effort: Pulmonary effort is normal. No respiratory distress.      Breath sounds: Normal breath sounds. No wheezing or rales.   Musculoskeletal:      Cervical back: Normal range of motion and neck supple.   Lymphadenopathy:      Cervical: Cervical adenopathy (Bilateral) present.   Skin:     General: Skin is warm.      Findings: No rash.   Neurological:      Mental Status: She is alert.

## 2025-01-13 NOTE — PROGRESS NOTES
HCC coding opportunities       Chart reviewed, no opportunity found: CHART REVIEWED, NO OPPORTUNITY FOUND        Patients Insurance        Commercial Insurance: [employer]

## 2025-01-13 NOTE — LETTER
January 13, 2025     Patient: Nadia Galeano   YOB: 2005   Date of Visit: 1/13/2025       To Whom It May Concern:    It is my medical opinion that Nadia Galeano may return to work on 1/16/2025 .    If you have any questions or concerns, please don't hesitate to call.         Sincerely,        Rubens Robb PA-C    CC: No Recipients

## 2025-01-14 ENCOUNTER — APPOINTMENT (OUTPATIENT)
Dept: LAB | Facility: HOSPITAL | Age: 20
End: 2025-01-14
Payer: COMMERCIAL

## 2025-01-14 DIAGNOSIS — J02.0 STREP THROAT: ICD-10-CM

## 2025-01-14 PROCEDURE — 36415 COLL VENOUS BLD VENIPUNCTURE: CPT

## 2025-01-14 PROCEDURE — 86308 HETEROPHILE ANTIBODY SCREEN: CPT

## 2025-01-15 LAB — HETEROPH AB SER QL: POSITIVE

## 2025-01-20 ENCOUNTER — OFFICE VISIT (OUTPATIENT)
Dept: FAMILY MEDICINE CLINIC | Facility: CLINIC | Age: 20
End: 2025-01-20
Payer: COMMERCIAL

## 2025-01-20 ENCOUNTER — TELEPHONE (OUTPATIENT)
Dept: PSYCHIATRY | Facility: CLINIC | Age: 20
End: 2025-01-20

## 2025-01-20 VITALS
WEIGHT: 135 LBS | HEART RATE: 72 BPM | RESPIRATION RATE: 16 BRPM | HEIGHT: 67 IN | TEMPERATURE: 97 F | SYSTOLIC BLOOD PRESSURE: 90 MMHG | DIASTOLIC BLOOD PRESSURE: 60 MMHG | BODY MASS INDEX: 21.19 KG/M2

## 2025-01-20 DIAGNOSIS — Z00.00 ROUTINE ADULT HEALTH MAINTENANCE: Primary | ICD-10-CM

## 2025-01-20 DIAGNOSIS — F32.2 SEVERE MAJOR DEPRESSIVE DISORDER (HCC): ICD-10-CM

## 2025-01-20 DIAGNOSIS — Z13.29 SCREENING FOR THYROID DISORDER: ICD-10-CM

## 2025-01-20 DIAGNOSIS — Z13.6 SCREENING FOR CARDIOVASCULAR CONDITION: ICD-10-CM

## 2025-01-20 DIAGNOSIS — Z23 ENCOUNTER FOR IMMUNIZATION: ICD-10-CM

## 2025-01-20 PROCEDURE — 90715 TDAP VACCINE 7 YRS/> IM: CPT

## 2025-01-20 PROCEDURE — 99395 PREV VISIT EST AGE 18-39: CPT | Performed by: NURSE PRACTITIONER

## 2025-01-20 PROCEDURE — 90471 IMMUNIZATION ADMIN: CPT

## 2025-01-20 NOTE — TELEPHONE ENCOUNTER
Lvm to sign the virtual care consent form. Explained in details where to find that consent. If there are any issues to call the office   left upper arm

## 2025-01-20 NOTE — ASSESSMENT & PLAN NOTE
Depression Screening Follow-up Plan: Patient's depression screening was positive with a PHQ-9 score of 8.

## 2025-01-20 NOTE — PROGRESS NOTES
Adult Annual Physical  Name: Nadia Galeano      : 2005      MRN: [id number]  Encounter Provider: FAMILIA Rice  Encounter Date: 2025   Encounter department: [facility]    Assessment & Plan  Routine adult health maintenance    Orders:  •  Ambulatory Referral to Obstetrics / Gynecology; Future    Encounter for immunization    Orders:  •  TDAP VACCINE GREATER THAN OR EQUAL TO 8YO IM    Severe major depressive disorder (HCC)  Depression Screening Follow-up Plan: Patient's depression screening was positive with a PHQ-9 score of 8.        Screening for cardiovascular condition    Orders:  •  CBC and differential; Future  •  Comprehensive metabolic panel; Future  •  Lipid panel; Future    Screening for thyroid disorder    Orders:  •  TSH, 3rd generation with Free T4 reflex; Future    Immunizations and preventive care screenings were discussed with patient today. Appropriate education was printed on patient's after visit summary.    Counseling:  Exercise: the importance of regular exercise/physical activity was discussed. Recommend exercise 3-5 times per week for at least 30 minutes.       Depression Screening and Follow-up Plan: Patient's depression screening was positive with a PHQ-9 score of 8.   Continue regular follow-up with their mental health provider who is managing their mental health condition(s). Will also discussed the use of OCPs in managing premenstrual depressive symptoms.  She plans to see a gynecologist      History of Present Illness     Adult Annual Physical:  Patient presents for annual physical. Here today for CPE.  Doing well overall.  Sees Dr. Stoll routinely, still has significant depression.  Over the past few months, has also been having suicidal thoughts around her menstrual cycle.  No active plan, more of thoughts that she would be better off dead..     Diet and Physical Activity:  - Diet/Nutrition: well balanced diet.  - Exercise: no formal  exercise.    Depression Screening:    - PHQ-9 Score: 8    General Health:  - Sleep:. difficulty falling asleep, sleeps excessively.  - Hearing: normal hearing bilateral ears.  - Vision: no vision problems and goes for regular eye exams.  - Dental: no dental visits for > 1 year and brushes teeth twice daily.    /GYN Health:    - Menopause: premenopausal.   - Contraception:. no prior pap      Review of Systems   Constitutional: Negative.    Eyes: Negative.    Respiratory: Negative.     Cardiovascular: Negative.    Gastrointestinal: Negative.    Genitourinary: Negative.    Musculoskeletal: Negative.    Neurological: Negative.    Psychiatric/Behavioral:          See HPI     Current Outpatient Medications on File Prior to Visit   Medication Sig Dispense Refill   • cariprazine (Vraylar) 3 MG capsule TAKE ONE CAPSULE BY MOUTH EVERY DAY 30 capsule 0   • desvenlafaxine (PRISTIQ) 100 mg 24 hr tablet TAKE 1 TABLET BY MOUTH IN THE  MORNING 30 tablet 0   • [DISCONTINUED] amoxicillin (AMOXIL) 500 mg capsule Take 1 capsule (500 mg total) by mouth every 12 (twelve) hours for 10 days (Patient not taking: Reported on 1/20/2025) 20 capsule 0   • [DISCONTINUED] benzonatate (TESSALON PERLES) 100 mg capsule Take 1 capsule (100 mg total) by mouth 3 (three) times a day as needed for cough (Patient not taking: Reported on 1/20/2025) 30 capsule 0   • [DISCONTINUED] benzonatate (TESSALON PERLES) 100 mg capsule Take 1 capsule (100 mg total) by mouth 3 (three) times a day as needed for cough (Patient not taking: Reported on 1/20/2025) 20 capsule 0   • [DISCONTINUED] methylPREDNISolone 4 MG tablet therapy pack Use as directed on package (Patient not taking: Reported on 1/20/2025) 21 tablet 0     No current facility-administered medications on file prior to visit.      Social History     Tobacco Use   • Smoking status: Never     Passive exposure: Never   • Smokeless tobacco: Never   Vaping Use   • Vaping status: Every Day   • Substances:  "Nicotine   Substance and Sexual Activity   • Alcohol use: Never   • Drug use: Not Currently   • Sexual activity: Yes     Partners: Male       Objective   BP 90/60   Pulse 72   Temp (!) 97 °F (36.1 °C)   Resp 16   Ht 5' 7.25\" (1.708 m)   Wt 61.2 kg (135 lb)   LMP  (Within Months) Comment: approx one month ago  BMI 20.99 kg/m²     Physical Exam  Vitals and nursing note reviewed.   Constitutional:       Appearance: Normal appearance. She is well-developed.   HENT:      Head: Normocephalic and atraumatic.      Right Ear: Tympanic membrane and external ear normal.      Left Ear: Tympanic membrane and external ear normal.      Nose: Nose normal.      Mouth/Throat:      Pharynx: Oropharynx is clear.   Eyes:      Extraocular Movements: Extraocular movements intact.      Conjunctiva/sclera: Conjunctivae normal.      Pupils: Pupils are equal, round, and reactive to light.   Neck:      Thyroid: No thyromegaly.      Vascular: No carotid bruit.   Cardiovascular:      Rate and Rhythm: Normal rate and regular rhythm.      Pulses: Normal pulses.      Heart sounds: Normal heart sounds. No murmur heard.  Pulmonary:      Effort: Pulmonary effort is normal.      Breath sounds: Normal breath sounds.   Abdominal:      General: Bowel sounds are normal. There is no distension.      Palpations: Abdomen is soft.      Tenderness: There is no abdominal tenderness.   Musculoskeletal:         General: No deformity. Normal range of motion.      Cervical back: Normal range of motion and neck supple.   Lymphadenopathy:      Cervical: No cervical adenopathy.   Skin:     General: Skin is warm and dry.      Capillary Refill: Capillary refill takes less than 2 seconds.   Neurological:      Mental Status: She is alert.      Sensory: No sensory deficit.      Motor: No abnormal muscle tone.      Coordination: Coordination normal.      Deep Tendon Reflexes: Reflexes normal.   Psychiatric:         Mood and Affect: Mood normal.         Behavior: " Behavior normal.

## 2025-01-21 ENCOUNTER — NURSE TRIAGE (OUTPATIENT)
Dept: OTHER | Facility: OTHER | Age: 20
End: 2025-01-21

## 2025-01-21 ENCOUNTER — TELEPHONE (OUTPATIENT)
Dept: PSYCHIATRY | Facility: CLINIC | Age: 20
End: 2025-01-21

## 2025-01-21 NOTE — TELEPHONE ENCOUNTER
"Reason for Disposition   [1] Caller requesting NON-URGENT health information AND [2] PCP's office is the best resource    Answer Assessment - Initial Assessment Questions  1. REASON FOR CALL: \"What is the main reason for your call?\" or \"How can I best help you?\"      Pts mom calling to see how they will join virtual appt tonight. After looking in patients chart it looks like appt was canceled d/t consent not being signed.    Pts mom will call tomorrow to set up new appt.    Protocols used: Information Only Call - No Triage-Adult-    "

## 2025-01-27 ENCOUNTER — TELEMEDICINE (OUTPATIENT)
Dept: PSYCHIATRY | Facility: CLINIC | Age: 20
End: 2025-01-27
Payer: COMMERCIAL

## 2025-01-27 DIAGNOSIS — F41.0 PANIC DISORDER: ICD-10-CM

## 2025-01-27 DIAGNOSIS — F41.1 GAD (GENERALIZED ANXIETY DISORDER): ICD-10-CM

## 2025-01-27 DIAGNOSIS — F32.2 SEVERE MAJOR DEPRESSIVE DISORDER (HCC): Primary | ICD-10-CM

## 2025-01-27 PROCEDURE — 99213 OFFICE O/P EST LOW 20 MIN: CPT | Performed by: NURSE PRACTITIONER

## 2025-01-28 NOTE — ASSESSMENT & PLAN NOTE
Vraylar 3 mg daily, Pristiq 100 mg in the morning.    Nadia reports her anxiety is controllable situational anxiety is expressed when has tasks to complete at work or for school.  She is coping and grandparents are supportive.  Medication helps with controlling anxiety.  Aims is negative.  Lab work was reviewed, recently had mononucleosis but is feeling better.

## 2025-01-28 NOTE — ASSESSMENT & PLAN NOTE
Pristiq 100 mg    Nadia reports depression is mild or situational, denies suicidal ideation.  Medications are effective in controlling mood.  She reports she is working and going to school part-time.  She is able to function at a  at work part-time and is going to college for early childhood education.  She broke up with her relationship however she is coping, she is social and denies panic attacks.  Appetite is reported as good and she is sleeping.  Lives with her grandparents; however, does see her sister and mother frequently.  Family are supportive.

## 2025-01-28 NOTE — BH TREATMENT PLAN
TREATMENT PLAN (Medication Management Only)         [facility]     Name and Date of Birth:  Nadia C Waleska 19 y.o. 2005  Date of Treatment Plan: October 3, 2020, updated June 22, 2021, December 14/2021, June 2, 2022, January 30, 2023, February 22, 2024  Diagnosis/Diagnoses:    1. Persistent mood (affective) disorder, unspecified (HCC)    2. Panic disorder       Strengths/Personal Resources for Self-Care: supportive family.  Area/Areas of need (in own words): anxiety, depression panic  1.         Long Term Goal: decrease anxiety, panic attacks and depression, increase weight by 10 lbs  Target Date:6 months - 7/27/2025  Person/Persons responsible for completion of goal: Nadia, provider, family  2.         Short Term Objective (s) - How will we reach this goal?:   A. Provider new recommended medication/dosage changes and/or continue medication(s): continue current medications as prescribed (add klonopin, increase buspar to 15 mg BID).  B. coping skills.  C. rest and increased nutrition.  Target Date:6 months -   7/27/2025  Person/Persons Responsible for Completion of Goal: Nadia, provider, family  Progress Towards Goals: continuing treatment  Treatment Modality: medication management every 1-3 months  Review due 180 days from date of this plan: 6 months - 7/27/2025     Expected length of service: maintenance  My Physician/PA/NP and I have developed this plan together and I agree to work on the goals and objectives. I understand the treatment goals that were developed for my treatment.

## 2025-01-28 NOTE — PSYCH
Virtual Regular Visit    Problem List Items Addressed This Visit          Behavioral Health    Severe major depressive disorder (HCC) - Primary    Pristiq 100 mg    Nadia reports depression is mild or situational, denies suicidal ideation.  Medications are effective in controlling mood.  She reports she is working and going to school part-time.  She is able to function at a  at work part-time and is going to college for early childhood education.  She broke up with her relationship however she is coping, she is social and denies panic attacks.  Appetite is reported as good and she is sleeping.  Lives with her grandparents; however, does see her sister and mother frequently.  Family are supportive.         Panic disorder    Denies panic attacks.         RAJAN (generalized anxiety disorder)    Vraylar 3 mg daily, Pristiq 100 mg in the morning.    Nadia reports her anxiety is controllable situational anxiety is expressed when has tasks to complete at work or for school.  She is coping and grandparents are supportive.  Medication helps with controlling anxiety.  Aims is negative.  Lab work was reviewed, recently had mononucleosis but is feeling better.          Reason for visit is   Chief Complaint   Patient presents with    Anxiety    Depression    Medication Management    Panic Attack     Encounter provider Aracelis Stoll, PhD    [facility] [address]  [address]  [facility] [id number] [phone]    Recent Visits  Date Type Provider Dept   01/27/25 Telemedicine Aracelis Stoll, PhD  [facility]   01/21/25 Telephone Iris Eid  [facility]   Showing recent visits within past 7 days and meeting all other requirements  Future Appointments  No visits were found meeting these conditions.  Showing future appointments within next 150 days and meeting all other requirements       After  connecting through [employer], the patient was identified by name and date of birth. Nadia Galeano was informed that this is a telemedicine visit and that the visit is being conducted through the Epic Embedded platform. She agrees to proceed. which may not be secure and therefore, might not be HIPAA-compliant.  My office door was closed. No one else was in the room.  She acknowledged consent and understanding of privacy and security of the video platform. The patient has agreed to participate and understands they can discontinue the visit at any time.    SUBJECTIVE:    Nadia Galeano is a 19 y.o. female with a history of panic attacks, anxiety, depression seen for medication management and mood assessment.    HPI ROS Appetite Changes and Sleep: normal appetite and normal energy level    Review Of Systems:     Mood Anxiety and Depression improved   Behavior Normal    Thought Content Normal   General Normal    Personality Normal   Other Psych Symptoms Normal   Constitutional As Noted in HPI   ENT As Noted in HPI   Cardiovascular As Noted in HPI   Respiratory As Noted in HPI   Gastrointestinal As Noted in HPI   Genitourinary As Noted in HPI   Musculoskeletal As Noted in HPI   Integumentary As Noted in HPI   Neurological Normal    Endocrine Normal    Other Symptoms Normal        Substance Abuse History:    Social History     Substance and Sexual Activity   Drug Use Not Currently       Family Psychiatric History:     Family History   Problem Relation Age of Onset    Anxiety disorder Mother     Leukemia Father     Diverticulosis Father     Anxiety disorder Maternal Grandmother     Depression Maternal Grandmother     Bipolar disorder Maternal Aunt        Social History     Socioeconomic History    Marital status: Single     Spouse name: Not on file    Number of children: Not on file    Years of education: Not on file    Highest education level: Not on file   Occupational History    Not on file   Tobacco Use    Smoking  status: Never     Passive exposure: Never    Smokeless tobacco: Never   Vaping Use    Vaping status: Every Day    Substances: Nicotine   Substance and Sexual Activity    Alcohol use: Never    Drug use: Not Currently    Sexual activity: Yes     Partners: Male   Other Topics Concern    Not on file   Social History Narrative    Not on file     Social Drivers of Health     Financial Resource Strain: Not on file   Food Insecurity: Not on file   Transportation Needs: Not on file   Physical Activity: Not on file   Stress: Not on file   Social Connections: Not on file   Intimate Partner Violence: Not on file   Housing Stability: Not on file       Past Medical History:   Diagnosis Date    Anxiety     Depression     Eating disorder     Panic attack     Urinary tract infection        Past Surgical History:   Procedure Laterality Date    NO PAST SURGERIES         Current Outpatient Medications   Medication Sig Dispense Refill    cariprazine (Vraylar) 3 MG capsule TAKE ONE CAPSULE BY MOUTH EVERY DAY 30 capsule 0    desvenlafaxine (PRISTIQ) 100 mg 24 hr tablet TAKE 1 TABLET BY MOUTH IN THE  MORNING 30 tablet 0     No current facility-administered medications for this visit.        No Known Allergies    The following portions of the patient's history were reviewed and updated as appropriate: allergies, current medications, past family history, past medical history, past social history, past surgical history, and problem list.    OBJECTIVE:     Mental Status Examination:    Appearance calm and cooperative , adequate hygiene and grooming, and good eye contact    Mood mood appropriate   Affect affect appropriate    Speech a normal rate   Thought Processes normal thought processes   Hallucinations no hallucinations present    Thought Content no delusions   Abnormal Thoughts no suicidal thoughts  and no homicidal thoughts    Orientation  oriented to person and place and time   Remote Memory short term memory intact and long term memory  "intact   Attention Span concentration intact   Intellect Appears to be of Average Intelligence   Insight Insight intact   Judgement judgment was intact   Muscle Strength Muscle strength and tone were normal   Language no difficulty naming common objects   Fund of Knowledge displays adequate knowledge of current events   Pain none   Pain Scale 0       Laboratory Results: No results found for this or any previous visit.    Assessment/Plan:       Diagnoses and all orders for this visit:    Severe major depressive disorder (HCC)    Panic disorder    RAJAN (generalized anxiety disorder)          Treatment Recommendations- Risks Benefits      Immediate Medical/Psychiatric/Psychotherapy Treatments and Any Precautions: Continue treatment plan    Risks, Benefits And Possible Side Effects Of Medications:  {PSYCH RISK, BENEFITS AND POSSIBLE SIDE EFFECTS (Optional):38805    Psychotherapy Provided: 20 minutes  Supportive therapy  Medication evaluation  Mood assessment  Surveys reviewed and confirmed  Normalized and validated her feelings  Safety plan updated   Goals discussed in session: Maintain stable mood    Treatment Plan:    Completed and signed during the session: Yes - Treatment Plan done but not signed at time of office visit due to:  Plan reviewed by video and verbal consent given due to virtual visit. Treatment Plan sent to patient via DivXt for signature.    I spent 20  minutes with the patient during this visit.  \"Portions of the record may have been created with voice recognition software. Occasional wrong word or \"sound a like\" substitutions may have occurred due to the inherent limitations of voice recognition software. Read the chart carefully and recognize, using context, where substitutions have occurred. Please call if you have any questions. \"    Aracelis Stoll, PhD 01/28/25  "

## 2025-01-28 NOTE — BH CRISIS PLAN
Client Name: Nadia Galeano       Client YOB: 2005    [facility] Safety Plan      Creation Date: 2/22/24 Update Date: 1/27/25   Created By: Aracelis Stoll, PhD Last Updated By: Aracelis Stoll, PhD      Step 1: Warning Signs:   Warning Signs   body tingling, chest heart dropped            Step 2: Internal Coping Strategies:   Internal Coping Strategies   distract self            Step 3: People and social settings that provide distraction:   Name Contact Information   family cell    Places   watch movie, shower           Step 4: People whom I can ask for help during a crisis:      Name Contact Information    mother cell    grandmother cell      Step 5: Professionals or agencies I can contact during a crisis:      Clinican/Agency Name Phone Emergency Contact    [facility] [phone] Dr. Stoll      [facility] Emergency Department Emergency Department Phone Emergency Department Address    [address]          Crisis Phone Numbers:   Suicide Prevention Lifeline: Call or Text  989 Crisis Text Line: Text HOME to 520-799   Please note: Some [facility] do not have a separate number for Child/Adolescent specific crisis. If your county is not listed under Child/Adolescent, please call the adult number for your county      Adult Crisis Numbers: Child/Adolescent Crisis Numbers   [facility]: [phone] [facility]: [phone]   [facility]: [phone] [facility]: [phone]   [facility]: [phone] [place]: [phone]   [facility]: [phone] [facility]: [phone]   [facility]: [phone]   [facility]: [phone]   [facility]: [phone]   [place] [facility]: [phone] (daytime) [phone] (after hours, weekends, holidays)      Step 6: Making the environment safer (plan for lethal means safety):   Patient did not identify any lethal methods: Yes     Optional: What is most important to me and worth living for?    Family and friends.     [place] Safety Plan. Elvie Nye and Alex Anderson. Used with permission of the authors.

## 2025-02-04 DIAGNOSIS — F32.A DEPRESSION, UNSPECIFIED DEPRESSION TYPE: ICD-10-CM

## 2025-02-05 RX ORDER — DESVENLAFAXINE 100 MG/1
100 TABLET, EXTENDED RELEASE ORAL EVERY MORNING
Qty: 30 TABLET | Refills: 3 | Status: SHIPPED | OUTPATIENT
Start: 2025-02-05

## 2025-02-05 NOTE — TELEPHONE ENCOUNTER
Patient called requesting refill for (PRISTIQ) 100 mg . Patient made aware medication was refilled on 2/5/2025 for 30 with 3 ref[facility]. Patient instructed to contact the pharmacy to obtain refills of medication. Patient verbalized understanding.

## 2025-03-05 ENCOUNTER — OFFICE VISIT (OUTPATIENT)
Dept: URGENT CARE | Facility: CLINIC | Age: 20
End: 2025-03-05
Payer: COMMERCIAL

## 2025-03-05 VITALS
RESPIRATION RATE: 16 BRPM | HEART RATE: 91 BPM | TEMPERATURE: 97.3 F | OXYGEN SATURATION: 100 % | SYSTOLIC BLOOD PRESSURE: 126 MMHG | BODY MASS INDEX: 20.99 KG/M2 | DIASTOLIC BLOOD PRESSURE: 79 MMHG | WEIGHT: 135 LBS

## 2025-03-05 DIAGNOSIS — J02.9 SORE THROAT: Primary | ICD-10-CM

## 2025-03-05 LAB — S PYO AG THROAT QL: NEGATIVE

## 2025-03-05 PROCEDURE — 87880 STREP A ASSAY W/OPTIC: CPT

## 2025-03-05 PROCEDURE — 99213 OFFICE O/P EST LOW 20 MIN: CPT

## 2025-03-05 NOTE — LETTER
March 5, 2025     Patient: Nadia Galeano   YOB: 2005   Date of Visit: 3/5/2025       To Whom it May Concern:    Nadia Galeano was seen in my clinic on 3/5/2025. She may return to work on 3/7/2025 .    If you have any questions or concerns, please don't hesitate to call.         Sincerely,          Safia Hirsch PA-C        CC: No Recipients

## 2025-03-05 NOTE — PROGRESS NOTES
[facility]        NAME: Nadia Galeano is a 19 y.o. female  : 2005    MRN: [id number]  DATE: 2025  TIME: 1:18 PM    Assessment and Plan   Sore throat [J02.9]  1. Sore throat  POCT rapid ANTIGEN strepA        Rapid strep negative in the office. Likely viral, supportive management.     Patient Instructions     Humidified air  Salt water gargles and chloraseptic spray  Warm tea with honey  Steam showers   Ensure adequate hydration    Follow up with PCP in 3-5 days.  Proceed to the ER with worsening symptoms.      If tests are performed, our office will contact you with results only if changes need to made to the care plan discussed with you at the visit. You can review your full results on [facility].    Chief Complaint     Chief Complaint   Patient presents with    Sore Throat     Started a couple days ago.          History of Present Illness       Sore Throat   This is a new problem. The current episode started in the past 7 days. The problem has been unchanged. Neither side of throat is experiencing more pain than the other. There has been no fever. Pertinent negatives include no abdominal pain, congestion, coughing, headaches, shortness of breath, trouble swallowing or vomiting. She has tried NSAIDs for the symptoms. The treatment provided moderate relief.       Review of Systems   Review of Systems   Constitutional:  Negative for chills and fever.   HENT:  Positive for sore throat. Negative for congestion, postnasal drip, rhinorrhea, sinus pressure and trouble swallowing.    Respiratory:  Negative for cough, chest tightness and shortness of breath.    Cardiovascular:  Negative for chest pain and palpitations.   Gastrointestinal:  Negative for abdominal pain, nausea and vomiting.   Genitourinary:  Negative for difficulty urinating.   Musculoskeletal:  Negative for myalgias.   Neurological:  Negative for dizziness and headaches.         Current Medications       Current  Outpatient Medications:     cariprazine (Vraylar) 3 MG capsule, TAKE ONE CAPSULE BY MOUTH EVERY DAY, Disp: 30 capsule, Rfl: 0    desvenlafaxine (PRISTIQ) 100 mg 24 hr tablet, TAKE 1 TABLET BY MOUTH IN THE  MORNING, Disp: 30 tablet, Rfl: 3    Current Allergies     Allergies as of 03/05/2025    (No Known Allergies)            The following portions of the patient's history were reviewed and updated as appropriate: allergies, current medications, past family history, past medical history, past social history, past surgical history and problem list.     Past Medical History:   Diagnosis Date    Anxiety     Depression     Eating disorder     Panic attack     Urinary tract infection        Past Surgical History:   Procedure Laterality Date    NO PAST SURGERIES         Family History   Problem Relation Age of Onset    Anxiety disorder Mother     Leukemia Father     Diverticulosis Father     Anxiety disorder Maternal Grandmother     Depression Maternal Grandmother     Bipolar disorder Maternal Aunt          Medications have been verified.        Objective   /79   Pulse 91   Temp (!) 97.3 °F (36.3 °C)   Resp 16   Wt 61.2 kg (135 lb)   SpO2 100%   BMI 20.99 kg/m²        Physical Exam     Physical Exam  Constitutional:       General: She is not in acute distress.     Appearance: She is not ill-appearing.   HENT:      Nose: Nose normal.      Mouth/Throat:      Mouth: Mucous membranes are moist.      Pharynx: Oropharynx is clear. No posterior oropharyngeal erythema.      Tonsils: No tonsillar exudate. 0 on the right. 0 on the left.   Eyes:      Pupils: Pupils are equal, round, and reactive to light.   Cardiovascular:      Rate and Rhythm: Normal rate and regular rhythm.      Pulses: Normal pulses.      Heart sounds: Normal heart sounds. No murmur heard.     No gallop.   Pulmonary:      Effort: Pulmonary effort is normal. No respiratory distress.      Breath sounds: Normal breath sounds. No wheezing.   Abdominal:       General: Abdomen is flat. Bowel sounds are normal. There is no distension.      Palpations: Abdomen is soft.      Tenderness: There is no abdominal tenderness.   Musculoskeletal:         General: Normal range of motion.      Cervical back: Normal range of motion.   Skin:     General: Skin is warm and dry.      Capillary Refill: Capillary refill takes less than 2 seconds.   Neurological:      Mental Status: She is alert and oriented to person, place, and time.

## 2025-03-05 NOTE — PATIENT INSTRUCTIONS
Humidified air  Salt water gargles and chloraseptic spray  Warm tea with honey  Steam showers   Ensure adequate hydration    Follow up with PCP in 3-5 days.  Proceed to the ER with worsening symptoms.

## 2025-03-19 DIAGNOSIS — F32.A DEPRESSION, UNSPECIFIED DEPRESSION TYPE: ICD-10-CM

## 2025-03-20 RX ORDER — DESVENLAFAXINE 100 MG/1
100 TABLET, EXTENDED RELEASE ORAL EVERY MORNING
Qty: 30 TABLET | Refills: 3 | Status: SHIPPED | OUTPATIENT
Start: 2025-03-20

## 2025-04-01 ENCOUNTER — TELEPHONE (OUTPATIENT)
Age: 20
End: 2025-04-01

## 2025-04-01 DIAGNOSIS — F41.0 PANIC DISORDER: ICD-10-CM

## 2025-04-01 DIAGNOSIS — F34.9 PERSISTENT MOOD (AFFECTIVE) DISORDER, UNSPECIFIED (HCC): ICD-10-CM

## 2025-04-01 DIAGNOSIS — F41.1 GAD (GENERALIZED ANXIETY DISORDER): ICD-10-CM

## 2025-04-01 NOTE — TELEPHONE ENCOUNTER
Patient mom Maci called asking for an appt for her daughter for tender lumps below the calf area. Same day appt was offered per mom daughter can not make it because she works 1pm-6pm. Told mom a nurse would be calling just to further triage and rule out the possibility of blood clots. Mom said daughter can not answer phone  while at work. I asked if she can advise her daughter to ask permission to take a call regarding her health, mom said she is unable and daughter also does not get lunch break so she is unable to call us. Patient was put on schedule for tomorrow and mom is unsure if daughter will make it. Notified mom the importance of keeping her appt, Please attempt to pat daughter to further triage if possible.

## 2025-04-02 ENCOUNTER — OFFICE VISIT (OUTPATIENT)
Dept: FAMILY MEDICINE CLINIC | Facility: CLINIC | Age: 20
End: 2025-04-02
Payer: COMMERCIAL

## 2025-04-02 ENCOUNTER — APPOINTMENT (OUTPATIENT)
Dept: RADIOLOGY | Facility: CLINIC | Age: 20
End: 2025-04-02
Payer: COMMERCIAL

## 2025-04-02 VITALS
RESPIRATION RATE: 18 BRPM | DIASTOLIC BLOOD PRESSURE: 62 MMHG | TEMPERATURE: 97.1 F | SYSTOLIC BLOOD PRESSURE: 104 MMHG | WEIGHT: 136 LBS | HEIGHT: 67 IN | BODY MASS INDEX: 21.35 KG/M2 | HEART RATE: 108 BPM

## 2025-04-02 DIAGNOSIS — M79.89 SOFT TISSUE MASS: Primary | ICD-10-CM

## 2025-04-02 DIAGNOSIS — M79.89 SOFT TISSUE MASS: ICD-10-CM

## 2025-04-02 PROCEDURE — 99213 OFFICE O/P EST LOW 20 MIN: CPT | Performed by: NURSE PRACTITIONER

## 2025-04-02 PROCEDURE — 73590 X-RAY EXAM OF LOWER LEG: CPT

## 2025-04-02 RX ORDER — CARIPRAZINE 3 MG/1
3 CAPSULE, GELATIN COATED ORAL DAILY
Qty: 30 CAPSULE | Refills: 0 | Status: SHIPPED | OUTPATIENT
Start: 2025-04-02

## 2025-04-02 NOTE — PROGRESS NOTES
"Name: Nadia Galeano      : 2005      MRN: [id number]  Encounter Provider: FAMILIA Payne  Encounter Date: 2025   Encounter department: [facility]  :  Assessment & Plan  Soft tissue mass  Will do Xray and ultrasound. Bone spur, cyst and lipoma in differential  Orders:  •  US extremity soft tissue; Future  •  XR tibia fibula 2 vw right; Future           History of Present Illness   Patient here with mother.  Stated that noticed 2 lumps noticed on right lower leg about 2 weeks ago and concerned      Review of Systems   HENT: Negative.     Respiratory: Negative.     Cardiovascular: Negative.    Gastrointestinal: Negative.    Musculoskeletal:         As noted in HPI           Objective   /62   Pulse (!) 108   Temp (!) 97.1 °F (36.2 °C)   Resp 18   Ht 5' 7\" (1.702 m)   Wt 61.7 kg (136 lb)   LMP 2025 (Exact Date)   BMI 21.30 kg/m²      Physical Exam  HENT:      Head: Normocephalic.   Eyes:      Conjunctiva/sclera: Conjunctivae normal.   Cardiovascular:      Rate and Rhythm: Normal rate and regular rhythm.      Pulses: Normal pulses.      Heart sounds: Normal heart sounds.   Pulmonary:      Effort: Pulmonary effort is normal.      Breath sounds: Normal breath sounds.   Musculoskeletal:        Legs:    Skin:     General: Skin is warm and dry.   Neurological:      Mental Status: She is alert and oriented to person, place, and time.   Psychiatric:         Mood and Affect: Mood normal.         Behavior: Behavior normal.         Thought Content: Thought content normal.         Judgment: Judgment normal.         "

## 2025-04-03 ENCOUNTER — RESULTS FOLLOW-UP (OUTPATIENT)
Dept: FAMILY MEDICINE CLINIC | Facility: CLINIC | Age: 20
End: 2025-04-03

## 2025-04-03 DIAGNOSIS — T14.8XXA HEMATOMA: Primary | ICD-10-CM

## 2025-04-10 ENCOUNTER — HOSPITAL ENCOUNTER (OUTPATIENT)
Dept: RADIOLOGY | Facility: HOSPITAL | Age: 20
Discharge: HOME | End: 2025-04-10
Payer: COMMERCIAL

## 2025-04-10 DIAGNOSIS — M79.89 SOFT TISSUE MASS: ICD-10-CM

## 2025-04-10 PROCEDURE — 76882 US LMTD JT/FCL EVL NVASC XTR: CPT

## 2025-04-14 NOTE — TELEPHONE ENCOUNTER
Patients mother returning Rosmery's phone call in regards to US results.  Called office clinical and office was unavailable.  Please call patients mother back with results.  Patients mother Maci is not able to answer her phone at work but is available and on break from 12pm-1pm.  Please call her during that time, or leave detailed voicemail with results if before that time.    Thank you

## 2025-04-14 NOTE — TELEPHONE ENCOUNTER
Pt's mother returned missed call and message from provider given. Mother states the pt did not injure her leg. Mother states the pt noticed 2 bumps on her leg and denies any injuries to it. Based off of the results and message from provider, mother wanted PCP to know this.    Please advise.

## 2025-05-08 DIAGNOSIS — F41.0 PANIC DISORDER: ICD-10-CM

## 2025-05-08 DIAGNOSIS — F34.9 PERSISTENT MOOD (AFFECTIVE) DISORDER, UNSPECIFIED (HCC): ICD-10-CM

## 2025-05-08 DIAGNOSIS — F41.1 GAD (GENERALIZED ANXIETY DISORDER): ICD-10-CM

## 2025-05-08 RX ORDER — CARIPRAZINE 3 MG/1
3 CAPSULE, GELATIN COATED ORAL DAILY
Qty: 30 CAPSULE | Refills: 0 | Status: SHIPPED | OUTPATIENT
Start: 2025-05-08

## 2025-05-08 NOTE — TELEPHONE ENCOUNTER
Patient's mother, Maci, called to request a refill for Vraylar. She was advised a refill was requested on 05/08/25 and is pending approval. Maci verbalized understanding and is in agreement.     Does the patient have enough for 3 days?   [] Yes   [x] No - Send as HP to POD   Patient has one tablet left.

## 2025-05-14 ENCOUNTER — OFFICE VISIT (OUTPATIENT)
Dept: URGENT CARE | Facility: CLINIC | Age: 20
End: 2025-05-14
Payer: COMMERCIAL

## 2025-05-14 VITALS
SYSTOLIC BLOOD PRESSURE: 124 MMHG | BODY MASS INDEX: 21.61 KG/M2 | DIASTOLIC BLOOD PRESSURE: 50 MMHG | RESPIRATION RATE: 14 BRPM | OXYGEN SATURATION: 99 % | WEIGHT: 138 LBS | TEMPERATURE: 97.6 F | HEART RATE: 100 BPM

## 2025-05-14 DIAGNOSIS — N39.0 URINARY TRACT INFECTION WITHOUT HEMATURIA, SITE UNSPECIFIED: Primary | ICD-10-CM

## 2025-05-14 DIAGNOSIS — N39.0 FREQUENT UTI: ICD-10-CM

## 2025-05-14 LAB
SL AMB  POCT GLUCOSE, UA: ABNORMAL
SL AMB LEUKOCYTE ESTERASE,UA: ABNORMAL
SL AMB POCT BILIRUBIN,UA: ABNORMAL
SL AMB POCT BLOOD,UA: ABNORMAL
SL AMB POCT CLARITY,UA: ABNORMAL
SL AMB POCT COLOR,UA: ABNORMAL
SL AMB POCT KETONES,UA: ABNORMAL
SL AMB POCT NITRITE,UA: ABNORMAL
SL AMB POCT PH,UA: 5
SL AMB POCT SPECIFIC GRAVITY,UA: 1.02
SL AMB POCT URINE PROTEIN: ABNORMAL
SL AMB POCT UROBILINOGEN: 0.2

## 2025-05-14 PROCEDURE — 81002 URINALYSIS NONAUTO W/O SCOPE: CPT | Performed by: PHYSICIAN ASSISTANT

## 2025-05-14 PROCEDURE — 99213 OFFICE O/P EST LOW 20 MIN: CPT | Performed by: PHYSICIAN ASSISTANT

## 2025-05-14 PROCEDURE — 87086 URINE CULTURE/COLONY COUNT: CPT | Performed by: PHYSICIAN ASSISTANT

## 2025-05-14 PROCEDURE — 87147 CULTURE TYPE IMMUNOLOGIC: CPT | Performed by: PHYSICIAN ASSISTANT

## 2025-05-14 RX ORDER — NITROFURANTOIN 25; 75 MG/1; MG/1
100 CAPSULE ORAL 2 TIMES DAILY
Qty: 10 CAPSULE | Refills: 0 | Status: SHIPPED | OUTPATIENT
Start: 2025-05-14 | End: 2025-05-19

## 2025-05-14 NOTE — PROGRESS NOTES
[facility]        NAME: Nadia Galeano is a 19 y.o. female  : 2005    MRN: [id number]  DATE: May 14, 2025  TIME: 2:57 PM    Assessment and Plan   Urinary tract infection without hematuria, site unspecified [N39.0]  1. Urinary tract infection without hematuria, site unspecified  POCT urine dip    Urine culture    nitrofurantoin (MACROBID) 100 mg capsule      2. Frequent UTI  Ambulatory Referral to Urology        Discussed strict return to care precautions as well as red flag symptoms which should prompt immediate ED referral. Pt verbalized understanding and is in agreement with plan.  Please follow up with your primary care provider within the next week. Please remember that your visit today was with an urgent care provider and should not replace follow up with your primary care provider for chronic medical issues or annual physicals.       Patient Instructions       Follow up with PCP in 3-5 days.  Proceed to  ER if symptoms worsen.    If tests are performed, our office will contact you with results only if changes need to made to the care plan discussed with you at the visit. [facility]    Chief Complaint     Chief Complaint   Patient presents with    Possible UTI     Pt presents with burning sensation, urgency, pain lower ABD/pelvic region// started this morning         History of Present Illness       Pt is a(n) 19 y.o. female with no pertinent pmh who presents out of concern for a UTI.  Dysuria: Yes  Urgency: Yes  Frequency: Yes  Hematuria: No  Cloudy/malodorous urine: No  Vaginal discharge/itching: No  Concerns for STD: No  Possibility of pregnancy: No  LMP: Patient's last menstrual period was 2025 (exact date).  Fever: No  Flank pain: No  Nausea/vomiting: No  Previous UTIs: Yes  States when she gets UTIs she gets a few in a month and has a hard time getting rid of them. Last UTI was within the last 6 months but not within the last  month.        Review of Systems   Review of Systems   Constitutional:  Negative for chills, diaphoresis and fever.   Respiratory:  Negative for shortness of breath.    Cardiovascular:  Negative for chest pain.   Gastrointestinal:  Negative for abdominal pain, nausea and vomiting.   Genitourinary:  Positive for dysuria, frequency and urgency. Negative for flank pain and hematuria.   Musculoskeletal:  Negative for back pain and myalgias.   Psychiatric/Behavioral:  Negative for confusion.          Current Medications     Current Medications[1]    Current Allergies     Allergies as of 05/14/2025    (No Known Allergies)            The following portions of the patient's history were reviewed and updated as appropriate: allergies, current medications, past family history, past medical history, past social history, past surgical history and problem list.     Past Medical History:   Diagnosis Date    Anxiety     Depression     Eating disorder     Panic attack     Urinary tract infection        Past Surgical History:   Procedure Laterality Date    NO PAST SURGERIES         Family History   Problem Relation Age of Onset    Anxiety disorder Mother     Leukemia Father     Diverticulosis Father     Anxiety disorder Maternal Grandmother     Depression Maternal Grandmother     Bipolar disorder Maternal Aunt          Medications have been verified.        Objective   /50   Pulse 100   Temp 97.6 °F (36.4 °C) (Tympanic)   Resp 14   Wt 62.6 kg (138 lb)   LMP 04/23/2025 (Exact Date)   SpO2 99%   BMI 21.61 kg/m²        Physical Exam     Physical Exam  Vitals and nursing note reviewed.   Constitutional:       General: She is not in acute distress.     Appearance: Normal appearance. She is not ill-appearing.   HENT:      Head: Normocephalic and atraumatic.     Cardiovascular:      Rate and Rhythm: Normal rate and regular rhythm.      Heart sounds: Normal heart sounds.   Pulmonary:      Effort: Pulmonary effort is normal. No  respiratory distress.      Breath sounds: Normal breath sounds. No wheezing, rhonchi or rales.   Abdominal:      General: Abdomen is flat.      Palpations: Abdomen is soft.      Tenderness: There is no abdominal tenderness. There is no right CVA tenderness, left CVA tenderness or guarding.     Skin:     General: Skin is warm and dry.      Capillary Refill: Capillary refill takes less than 2 seconds.     Neurological:      Mental Status: She is alert and oriented to person, place, and time.     Psychiatric:         Behavior: Behavior normal.                        [1]   Current Outpatient Medications:     desvenlafaxine (PRISTIQ) 100 mg 24 hr tablet, TAKE 1 TABLET BY MOUTH IN THE  MORNING, Disp: 30 tablet, Rfl: 3    nitrofurantoin (MACROBID) 100 mg capsule, Take 1 capsule (100 mg total) by mouth 2 (two) times a day for 5 days, Disp: 10 capsule, Rfl: 0    Vraylar 3 MG capsule, TAKE ONE CAPSULE BY MOUTH EVERY DAY, Disp: 30 capsule, Rfl: 0

## 2025-05-15 LAB
BACTERIA UR CULT: ABNORMAL
BACTERIA UR CULT: ABNORMAL

## 2025-06-06 DIAGNOSIS — F32.A DEPRESSION, UNSPECIFIED DEPRESSION TYPE: ICD-10-CM

## 2025-06-08 DIAGNOSIS — F41.1 GAD (GENERALIZED ANXIETY DISORDER): ICD-10-CM

## 2025-06-08 DIAGNOSIS — F41.0 PANIC DISORDER: ICD-10-CM

## 2025-06-08 DIAGNOSIS — F34.9 PERSISTENT MOOD (AFFECTIVE) DISORDER, UNSPECIFIED (HCC): ICD-10-CM

## 2025-06-09 RX ORDER — CARIPRAZINE 3 MG/1
3 CAPSULE, GELATIN COATED ORAL DAILY
Qty: 30 CAPSULE | Refills: 0 | Status: SHIPPED | OUTPATIENT
Start: 2025-06-09

## 2025-06-09 RX ORDER — DESVENLAFAXINE 100 MG/1
100 TABLET, EXTENDED RELEASE ORAL EVERY MORNING
Qty: 30 TABLET | Refills: 0 | Status: SHIPPED | OUTPATIENT
Start: 2025-06-09

## 2025-06-09 NOTE — TELEPHONE ENCOUNTER
Patient's mother called in requesting a refill for her daughter's Vraylar. She wants refills added to it if possible. I did let her know it looks like her daughter is due for an appointment and maybe that's why refills weren't put on it. She verbalized understanding.    Please contact patient to schedule appointment if possible.    Reason for call:   [x] Refill   [] Prior Auth  [] Other:     Office:   [] PCP/Provider -   [x] Specialty/Provider - Psychiatry     Medication: Vraylar     Dose/Frequency: 3 mg capsule taken by mouth once every day     Quantity: 30     Pharmacy: [id number] - [place], [place] - [address] [place] [address] [phone]     Local Pharmacy   Does the patient have enough for 3 days?   [] Yes   [x] No - Send as HP to POD    Mail Away Pharmacy   Does the patient have enough for 10 days?   [] Yes   [] No - Send as HP to POD

## 2025-06-12 ENCOUNTER — TELEPHONE (OUTPATIENT)
Dept: PSYCHIATRY | Facility: CLINIC | Age: 20
End: 2025-06-12

## 2025-06-12 NOTE — TELEPHONE ENCOUNTER
Called and left a VMM for patient to call back and schedule a appointment with her provider. Left a call back number to schedule. AZUL

## 2025-06-24 ENCOUNTER — TELEPHONE (OUTPATIENT)
Dept: PSYCHIATRY | Facility: CLINIC | Age: 20
End: 2025-06-24

## 2025-06-24 ENCOUNTER — TELEPHONE (OUTPATIENT)
Age: 20
End: 2025-06-24

## 2025-06-24 NOTE — TELEPHONE ENCOUNTER
Patient's parent/guardian contacted the office to schedule a follow up visit with provider. Patient is now scheduled for 7/22/25  at 11:15a virtually.

## 2025-06-24 NOTE — TELEPHONE ENCOUNTER
This writer left message advising patient her appt on 7/22 w Dr Stoll will need to be rescheduled due to change in providers schedule

## 2025-06-26 NOTE — TELEPHONE ENCOUNTER
The patient called to reschedule the appointment for 7.22.25.  The appointment has been established for:  7.24.25 @ 10:30am    -Virtual consent form has been sent to the patients My Chart to be signed prior to the appointment.

## 2025-06-29 DIAGNOSIS — F32.A DEPRESSION, UNSPECIFIED DEPRESSION TYPE: ICD-10-CM

## 2025-07-01 RX ORDER — DESVENLAFAXINE 100 MG/1
100 TABLET, EXTENDED RELEASE ORAL EVERY MORNING
Qty: 30 TABLET | Refills: 1 | Status: SHIPPED | OUTPATIENT
Start: 2025-07-01

## 2025-07-03 ENCOUNTER — OFFICE VISIT (OUTPATIENT)
Dept: FAMILY MEDICINE CLINIC | Facility: CLINIC | Age: 20
End: 2025-07-03
Payer: COMMERCIAL

## 2025-07-03 VITALS
BODY MASS INDEX: 22.47 KG/M2 | SYSTOLIC BLOOD PRESSURE: 118 MMHG | HEART RATE: 100 BPM | WEIGHT: 143.2 LBS | DIASTOLIC BLOOD PRESSURE: 72 MMHG | HEIGHT: 67 IN | TEMPERATURE: 98.4 F | RESPIRATION RATE: 17 BRPM

## 2025-07-03 DIAGNOSIS — M79.89 SOFT TISSUE MASS: Primary | ICD-10-CM

## 2025-07-03 PROCEDURE — 99213 OFFICE O/P EST LOW 20 MIN: CPT | Performed by: NURSE PRACTITIONER

## 2025-07-03 NOTE — PROGRESS NOTES
"Name: Nadia Galeano      : 2005      MRN: [id number]  Encounter Provider: FAMILIA Rice  Encounter Date: 7/3/2025   Encounter department: [facility]  :  Assessment & Plan  Soft tissue mass  Will start with US, f/u with results.  Orders:    US head neck soft tissue; Future           History of Present Illness   Here today for evaluation of a lump near her eyebrow.  Has been present for 4 months or son.  Sometimes more noticeable than others, but no associated with any pain or soft tissue swelling      Review of Systems   Constitutional: Negative.    Skin:         See HPI   All other systems reviewed and are negative.      Objective   /72   Pulse 100   Temp 98.4 °F (36.9 °C)   Resp 17   Ht 5' 7\" (1.702 m)   Wt 65 kg (143 lb 3.2 oz)   BMI 22.43 kg/m²      Physical Exam  Vitals and nursing note reviewed.   Constitutional:       General: She is not in acute distress.     Appearance: Normal appearance. She is well-developed. She is not diaphoretic.     Eyes:      Conjunctiva/sclera: Conjunctivae normal.       Pulmonary:      Effort: Pulmonary effort is normal. No respiratory distress.     Neurological:      Mental Status: She is alert.     Psychiatric:         Mood and Affect: Mood normal.         Behavior: Behavior normal.         "

## 2025-07-09 DIAGNOSIS — F41.0 PANIC DISORDER: ICD-10-CM

## 2025-07-09 DIAGNOSIS — F34.9 PERSISTENT MOOD (AFFECTIVE) DISORDER, UNSPECIFIED (HCC): ICD-10-CM

## 2025-07-09 DIAGNOSIS — F41.1 GAD (GENERALIZED ANXIETY DISORDER): ICD-10-CM

## 2025-07-09 NOTE — TELEPHONE ENCOUNTER
Patient is scheduled for appointment on 7/24/25.    Reason for call:   [x] Refill   [] Prior Auth  [] Other:     Office:   [] PCP/Provider -   [x] Specialty/Provider - Psychiatry     Medication: Vraylar     Dose/Frequency: 3 mg capsule taken by mouth once daily     Quantity: 30    Pharmacy: [id number] - [place], [place] - [facility] [address] [phone]     Local Pharmacy   Does the patient have enough for 3 days?   [] Yes   [x] No - Send as HP to POD    Mail Away Pharmacy   Does the patient have enough for 10 days?   [] Yes   [] No - Send as HP to POD

## 2025-07-10 RX ORDER — CARIPRAZINE 3 MG/1
3 CAPSULE, GELATIN COATED ORAL DAILY
Qty: 30 CAPSULE | Refills: 0 | OUTPATIENT
Start: 2025-07-10

## 2025-07-22 DIAGNOSIS — F32.A DEPRESSION, UNSPECIFIED DEPRESSION TYPE: ICD-10-CM

## 2025-07-23 RX ORDER — DESVENLAFAXINE 100 MG/1
100 TABLET, EXTENDED RELEASE ORAL EVERY MORNING
Qty: 30 TABLET | Refills: 3 | Status: SHIPPED | OUTPATIENT
Start: 2025-07-23

## 2025-07-24 ENCOUNTER — TELEMEDICINE (OUTPATIENT)
Dept: PSYCHIATRY | Facility: CLINIC | Age: 20
End: 2025-07-24
Payer: COMMERCIAL

## 2025-07-24 DIAGNOSIS — F41.0 PANIC DISORDER: Primary | ICD-10-CM

## 2025-07-24 DIAGNOSIS — Z79.899 HIGH RISK MEDICATION USE: ICD-10-CM

## 2025-07-24 DIAGNOSIS — E55.9 VITAMIN D DEFICIENCY: ICD-10-CM

## 2025-07-24 DIAGNOSIS — F33.9 DEPRESSION, RECURRENT (HCC): ICD-10-CM

## 2025-07-24 PROCEDURE — 99213 OFFICE O/P EST LOW 20 MIN: CPT | Performed by: NURSE PRACTITIONER

## 2025-07-24 NOTE — ASSESSMENT & PLAN NOTE
Pristiq 100 mg in the morning Vraylar 3 mg daily  Reports panic disorder has declined.  Has not had a panic attack in a long time.  Supportive therapy was provided lab work was ordered due to antipsychotic therapy.  She denies side effects of medication aims is negative.

## 2025-07-24 NOTE — ASSESSMENT & PLAN NOTE
Pristiq 100 mg in the morning  Nadia reports medication is effective for her depression PHQ-9 score of 8 indicates mild depression no suicidal ideation.  Denies side effects she is working full-time in the , functioning well.  Reports appetite and sleep patterns are good.  Denies depression and has coping strategies if something makes her sad.  Reviewed coping strategies.  Takes medication as prescribed and family are supportive

## 2025-07-24 NOTE — PSYCH
Virtual Regular Visit    Patient is located at Home in the following state in which I hold an active license [place].    Assessment & Plan  Panic disorder  Pristiq 100 mg in the morning Vraylar 3 mg daily  Reports panic disorder has declined.  Has not had a panic attack in a long time.  Supportive therapy was provided lab work was ordered due to antipsychotic therapy.  She denies side effects of medication aims is negative.       Depression, recurrent (HCC)  Pristiq 100 mg in the morning  Nadia reports medication is effective for her depression PHQ-9 score of 8 indicates mild depression no suicidal ideation.  Denies side effects she is working full-time in the , functioning well.  Reports appetite and sleep patterns are good.  Denies depression and has coping strategies if something makes her sad.  Reviewed coping strategies.  Takes medication as prescribed and family are supportive       High risk medication use    Orders:  •  CBC and differential; Future  •  Comprehensive metabolic panel; Future  •  Hemoglobin A1C; Future  •  TSH, 3rd generation with Free T4 reflex; Future  •  Prolactin; Future  •  Lipid panel; Future    Vitamin D deficiency    Orders:  •  Vitamin D 25 hydroxy; Future              Reason for visit is   Chief Complaint   Patient presents with   • Depression   • Anxiety   • Panic Attack   • Medication Management        Visit Time  Visit Start Time: 1030  Visit Stop Time: 1050  Total Visit Duration: 20 minutes    Encounter provider: Aracelis Stoll, PhD  Provider located at PSYCHIATRIC [address]  [address]  [facility] [address] [phone]    Recent Visits  No visits were found meeting these conditions.  Showing recent visits within past 7 days and meeting all other requirements  Today's Visits  Date Type Provider Dept   07/24/25 Telemedicine Aracelis Stoll, PhD  Psychiatric [facility]   Showing today's visits and  meeting all other requirements  Future Appointments  No visits were found meeting these conditions.  Showing future appointments within next 150 days and meeting all other requirements       Administrative Statements   Encounter provider Aracelis Stoll, PhD    The Patient is located at Home and in the following state in which I hold an active license [place].    The patient was identified by name and date of birth. Nadia Galeano was informed that this is a telemedicine visit and that the visit is being conducted through the Epic Embedded platform. She agrees to proceed..  My office door was closed. No one else was in the room.  She acknowledged consent and understanding of privacy and security of the video platform. The patient has agreed to participate and understands they can discontinue the visit at any time.    I have spent a total time of 20 minutes in caring for this patient on the day of the visit/encounter including Risks and benefits of tx options, Impressions, Counseling / Coordination of care, and Documenting in the medical record, not including the time spent for establishing the audio/video connection.        MEDICATION MANAGEMENT NOTE        [facility]      Name and Date of Birth:  Nadia Galeano 20 y.o. 2005 MRN: [id number]    Date of Visit: July 24, 2025         - Medications sent to the patient's pharmacy for 90 day supply    - Psychoeducation provided to the patient and benefits, potential risks and side effects discussed; importance of compliance with the psychiatric treatment reiterated, and the patient verbalized understanding of the matter  - RTC in 12 weeks  - The patient was educated about 24 hour and weekend coverage for urgent situations accessed by calling [facility] main practice number  - Patient was educated to call National Suicide Prevention Lifeline (6-557-743-TALK [1600]) for behavioral crisis at anytime or  911 for any safety concerns, or go to nearest ER if the symptoms become overwhelming or unmanageable.         Subjective        Nadia Galeano is a 20 y.o. female, visited for Depression, Anxiety, Panic Attack, and Medication Management, who was virtually seen and evaluated today at the [facility] outpatient clinic for follow-up and medication management. Completes psychiatric assessment without difficulty.     At previous outpatient psychiatric appointment with this writer, Nadia reports depression is mild or situational, denies suicidal ideation.  Medications are effective in controlling mood.  She reports she is working and going to school part-time.  She is able to function at a  at work part-time and is going to college for early childhood education.  She broke up with her relationship however she is coping, she is social and denies panic attacks.  Appetite is reported as good and she is sleeping.  Lives with her grandparents; however, does see her sister and mother frequently.  Family are supportive.   .   She denies any current adverse medication side effects.  Nadia reports her anxiety is controllable situational anxiety is expressed when has tasks to complete at work or for school.  She is coping and grandparents are supportive.  Medication helps with controlling anxiety.  Aims is negative.  Lab work was reviewed, recently had mononucleosis but is feeling better    Nadia is responding well to the treatment plan she is not going to college however she does work in a  full-time reports anxiety and depression are under control denies panic attacks.  Reports coping skills.  Takes medication as prescribed and family are supportive.  She has a new boyfriend who is very supportive.  Denies side effects of medications appetite is good and she is sleeping.  Denies problems or concerns and will call with any issues.  Encouraged to make an appointment in 3 months, or as needed if  necessary.                Review Of Systems:  Pertinent items are noted in HPI; all others are negative; no recent changes in medications or health status reported.    PHQ-2/9 Depression Screening    Little interest or pleasure in doing things: 1 - several days  Feeling down, depressed, or hopeless: 1 - several days  Trouble falling or staying asleep, or sleeping too much: 2 - more than half the days  Feeling tired or having little energy: 2 - more than half the days  Poor appetite or overeatin - not at all  Feeling bad about yourself - or that you are a failure or have let yourself or your family down: 2 - more than half the days  Trouble concentrating on things, such as reading the newspaper or watching television: 0 - not at all  Moving or speaking so slowly that other people could have noticed. Or the opposite - being so fidgety or restless that you have been moving around a lot more than usual: 0 - not at all  Thoughts that you would be better off dead, or of hurting yourself in some way: 0 - not at all  PHQ-9 Score: 8  PHQ-9 Interpretation: Mild depression         RAJAN-7 Flowsheet Screening    Flowsheet Row Most Recent Value   Over the last two weeks, how often have you been bothered by the following problems?     Feeling nervous, anxious, or on edge 1    Not being able to stop or control worrying 1    Worrying too much about different things 1    Trouble relaxing  1    Being so restless that it's hard to sit still 1    Becoming easily annoyed or irritable  1    Feeling afraid as if something awful might happen 2    How difficult have these problems made it for you to do your work, take care of things at home, or get along with other people?  Very difficult    RAJAN Score  8           Historical Information        Past Medical History:    Past Medical History[1]     Past Surgical History[1]  Allergies[1]    Substance Abuse History:    Social History     Substance and Sexual Activity   Alcohol Use Never      Social History     Substance and Sexual Activity   Drug Use Not Currently       Social History:    Social History     Socioeconomic History   • Marital status: Single     Spouse name: Not on file   • Number of children: Not on file   • Years of education: Not on file   • Highest education level: Not on file   Occupational History   • Not on file   Tobacco Use   • Smoking status: Never     Passive exposure: Never   • Smokeless tobacco: Never   Vaping Use   • Vaping status: Every Day   • Substances: Nicotine   Substance and Sexual Activity   • Alcohol use: Never   • Drug use: Not Currently   • Sexual activity: Yes     Partners: Male   Other Topics Concern   • Not on file   Social History Narrative   • Not on file     Social Drivers of Health     Financial Resource Strain: Not on file   Food Insecurity: Not on file   Transportation Needs: Not on file   Physical Activity: Not on file   Stress: Not on file   Social Connections: Not on file   Intimate Partner Violence: Not on file   Housing Stability: Not on file       Family Psychiatric History:     Family History[1]    History Review: The following portions of the patient's history were reviewed and updated as appropriate: allergies, current medications, past family history, past medical history, past social history, past surgical history and problem list.           Objective      Vital signs in last 24 hours:  There were no vitals filed for this visit.    Mental Status Evaluation:    Appearance age appropriate, casually dressed   Behavior cooperative, calm   Speech normal rate, normal volume, normal pitch   Mood improved   Affect normal range and intensity, appropriate   Thought Processes organized, goal directed   Associations intact associations   Thought Content no overt delusions   Perceptual Disturbances: no auditory hallucinations, no visual hallucinations   Abnormal Thoughts  Risk Potential Suicidal ideation - None  Homicidal ideation - None  Potential for  aggression - No   Orientation oriented to: person, place, time/date, and situation   Memory recent and remote memory grossly intact   Consciousness alert and awake   Attention Span Concentration Span attention span and concentration are age appropriate   Intellect not formally assessed   Insight intact   Judgement intact   Muscle Strength and  Gait unable to assess today due to virtual visit   Motor activity unable to assess today due to virtual visit   Language within normal limits   Fund of Knowledge adequate knowledge of current events             Laboratory Results: I have personally reviewed all pertinent laboratory/tests results  Recent Labs (last 6 months):   Office Visit on 05/14/2025   Component Date Value   • LEUKOCYTE ESTERASE,UA 05/14/2025 mod    • NITRITE,UA 05/14/2025 neg    • SL AMB POCT UROBILINOGEN 05/14/2025 0.2    • POCT URINE PROTEIN 05/14/2025 trace    •  PH,UA 05/14/2025 5.0    • BLOOD,UA 05/14/2025 mod    • SPECIFIC GRAVITY,UA 05/14/2025 1.025    • KETONES,UA 05/14/2025 neg    • BILIRUBIN,UA 05/14/2025 neg    • GLUCOSE, UA 05/14/2025 neg    •  COLOR,UA 05/14/2025 straw    • CLARITY,UA 05/14/2025 cloudy    • Urine Culture 05/14/2025 30,000-39,000 cfu/ml Beta Hemolytic Streptococcus Group B (A)    • Urine Culture 05/14/2025 >100,000 cfu/ml    Office Visit on 03/05/2025   Component Date Value   •  RAPID STREP A 03/05/2025 Negative                Current Outpatient Medications   Medication Sig Dispense Refill   • cariprazine (Vraylar) 3 MG capsule Take 1 capsule (3 mg total) by mouth daily 30 capsule 3   • desvenlafaxine (PRISTIQ) 100 mg 24 hr tablet TAKE 1 TABLET BY MOUTH IN THE  MORNING 30 tablet 3     No current facility-administered medications for this visit.         Medications Risks/Benefits      Risks, Benefits And Possible Side Effects Of Medications:    Risks, benefits, and possible side effects of medications explained to Nadia and she verbalizes understanding and agreement for  treatment.    Controlled Medication Discussion:     Not applicable    Psychotherapy Provided:     Individual psychotherapy provided: Supportive therapy provided.    Lab tests ordered    Depression Screening Follow-up Plan: Patient's depression screening was positive with a PHQ-9 score of 8. Patient assessed for underlying major depression. They have no active suicidal ideations. Brief counseling provided and recommend additional follow-up/re-evaluation next office visit.   Treatment Plan:    Completed and signed during the session: Yes - Treatment Plan done but not signed at time of office visit due to:  Plan reviewed by video and verbal consent given due to virtual visit. Treatment Plan sent to patient via [employer] for signature.    Note Share    This note was not shared with the patient due to this is a psychotherapy note    Aracelis Stoll, PhD 07/24/25    This note was completed in part utilizing Dragon dictation Software. Grammatical, translation, syntax errors, random word insertions, spelling mistakes, and incomplete sentences may be an occasional consequence of this system secondary to software limitations with voice recognition, ambient noise, and hardware issues. If you have any questions or concerns about the content, text, or information contained within the body of this dictation, please contact the provider for clarification.          [1]  Past Medical History:  Diagnosis Date   • Anxiety    • Depression    • Eating disorder    • Panic attack    • Urinary tract infection    [1]  Past Surgical History:  Procedure Laterality Date   • NO PAST SURGERIES     [1]  No Known Allergies[1]  Family History  Problem Relation Name Age of Onset   • Anxiety disorder Mother     • Leukemia Father     • Diverticulosis Father     • Anxiety disorder Maternal Grandmother     • Depression Maternal Grandmother     • Bipolar disorder Maternal Aunt

## 2025-07-24 NOTE — BH TREATMENT PLAN
TREATMENT PLAN (Medication Management Only)         [facility]     Name and Date of Birth:  Nadia C Waleska 20 y.o. 2005  Date of Treatment Plan: October 3, 2020, updated June 22, 2021, December 14/2021, June 2, 2022, January 30, 2023, February 22, 2024, July 24, 2026  Diagnosis/Diagnoses:    1. Persistent mood (affective) disorder, unspecified (HCC)    2. Panic disorder       Strengths/Personal Resources for Self-Care: supportive family.  Area/Areas of need (in own words): anxiety, depression panic  1.         Long Term Goal: decrease anxiety, panic attacks and depression, increase weight by 10 lbs  Target Date:6 months - 1/24/2026  Person/Persons responsible for completion of goal: parisa Zuniga, family  2.         Short Term Objective (s) - How will we reach this goal?:   A. Provider new recommended medication/dosage changes and/or continue medication(s): continue current medications as prescribed (add klonopin, increase buspar to 15 mg BID).  B. coping skills.  C. rest and increased nutrition.  Target Date:6 months -   1/24/2026  Person/Persons Responsible for Completion of Goal: Nadia, provider, family  Progress Towards Goals: continuing treatment  Treatment Modality: medication management every 1-3 months  Review due 180 days from date of this plan: 6 months -1/24/2026     Expected length of service: maintenance  My Physician/PA/NP and I have developed this plan together and I agree to work on the goals and objectives. I understand the treatment goals that were developed for my treatment.